# Patient Record
Sex: FEMALE | Race: WHITE | NOT HISPANIC OR LATINO
[De-identification: names, ages, dates, MRNs, and addresses within clinical notes are randomized per-mention and may not be internally consistent; named-entity substitution may affect disease eponyms.]

---

## 2019-12-24 ENCOUNTER — TRANSCRIPTION ENCOUNTER (OUTPATIENT)
Age: 84
End: 2019-12-24

## 2019-12-26 ENCOUNTER — TRANSCRIPTION ENCOUNTER (OUTPATIENT)
Age: 84
End: 2019-12-26

## 2021-07-06 ENCOUNTER — TRANSCRIPTION ENCOUNTER (OUTPATIENT)
Age: 86
End: 2021-07-06

## 2021-10-21 ENCOUNTER — APPOINTMENT (OUTPATIENT)
Dept: ORTHOPEDIC SURGERY | Facility: CLINIC | Age: 86
End: 2021-10-21
Payer: MEDICARE

## 2021-10-21 VITALS
WEIGHT: 137.4 LBS | HEART RATE: 67 BPM | SYSTOLIC BLOOD PRESSURE: 159 MMHG | DIASTOLIC BLOOD PRESSURE: 76 MMHG | HEIGHT: 62 IN | BODY MASS INDEX: 25.28 KG/M2

## 2021-10-21 DIAGNOSIS — R20.8 OTHER DISTURBANCES OF SKIN SENSATION: ICD-10-CM

## 2021-10-21 DIAGNOSIS — Z86.79 PERSONAL HISTORY OF OTHER DISEASES OF THE CIRCULATORY SYSTEM: ICD-10-CM

## 2021-10-21 DIAGNOSIS — Z82.3 FAMILY HISTORY OF STROKE: ICD-10-CM

## 2021-10-21 DIAGNOSIS — Z86.39 PERSONAL HISTORY OF OTHER ENDOCRINE, NUTRITIONAL AND METABOLIC DISEASE: ICD-10-CM

## 2021-10-21 DIAGNOSIS — Z78.9 OTHER SPECIFIED HEALTH STATUS: ICD-10-CM

## 2021-10-21 DIAGNOSIS — Z87.39 PERSONAL HISTORY OF OTHER DISEASES OF THE MUSCULOSKELETAL SYSTEM AND CONNECTIVE TISSUE: ICD-10-CM

## 2021-10-21 PROCEDURE — 99203 OFFICE O/P NEW LOW 30 MIN: CPT

## 2021-10-21 RX ORDER — MULTIVIT-MIN/FA/LYCOPEN/LUTEIN .4-300-25
TABLET ORAL
Refills: 0 | Status: ACTIVE | COMMUNITY

## 2021-10-21 RX ORDER — DORZOLAMIDE HYDROCHLORIDE 20 MG/ML
2 SOLUTION OPHTHALMIC
Refills: 0 | Status: ACTIVE | COMMUNITY

## 2021-10-21 NOTE — HISTORY OF PRESENT ILLNESS
[de-identified] : This 89-year-old woman is accompanied by her daughter and presents for evaluation of numbness in her left foot.  She has had bilateral total hip replacements.  The first was in 2007 and the second in 2014.  The recent hip replacement was performed by Dr. Aldridge.  In 2019 she fell and dislocated her left total hip.  This was reduced in the emergency room.  She now states that her left leg falls asleep for the last month.  She localizes the area just to her left foot.  She has had no recent trauma.  She takes no medication for this problem.  She was seen in Dr. Aldridge's office but no diagnosis was given.

## 2021-10-21 NOTE — PHYSICAL EXAM
[LE] : Sensory: Intact in bilateral lower extremities [Knee] : patellar 2+ and symmetric bilaterally [Ankle] : ankle 2+ and symmetric bilaterally [DP] : dorsalis pedis 2+ and symmetric bilaterally [PT] : posterior tibial 2+ and symmetric bilaterally [Normal] : Alert and in no acute distress [Poor Appearance] : well-appearing [Acute Distress] : not in acute distress [de-identified] : The patient has no respiratory distress. Mood and affect are normal. The patient is alert and oriented to person, place and time.\par The patient has no tenderness of either hip.  There is no pain with active or passive motion of the hips.  There is no tenderness of either knee.  She has minimal discomfort with knee motion and mild crepitus.  Both calves are soft and nontender.  Both Achilles tendons are intact.  She feels that her left foot is mildly numb compared to the right.  She has normal motor function in both lower extremities.  The numbness is not affected by motion of the hips, knees or ankles.  She has varicosities in the lower extremities. [de-identified] : X-rays of both hips were taken on October 6, 2021 at [facility] imaging.  These x-rays are reviewed.  They demonstrate bilateral total hip arthroplasty in good position.  She also had x-rays performed in both knees on the same day which demonstrate osteoarthritis of both knees.

## 2021-10-21 NOTE — DISCUSSION/SUMMARY
[de-identified] : The patient has apparent numbness of her left foot.  Motor function is quite normal.  I do not associate this with any of her orthopedic conditions in the lower extremities.  I have recommended she have neurologic evaluation if this troubles her.

## 2021-10-23 ENCOUNTER — TRANSCRIPTION ENCOUNTER (OUTPATIENT)
Age: 86
End: 2021-10-23

## 2021-11-08 ENCOUNTER — TRANSCRIPTION ENCOUNTER (OUTPATIENT)
Age: 86
End: 2021-11-08

## 2022-01-10 ENCOUNTER — TRANSCRIPTION ENCOUNTER (OUTPATIENT)
Age: 87
End: 2022-01-10

## 2022-08-07 ENCOUNTER — NON-APPOINTMENT (OUTPATIENT)
Age: 87
End: 2022-08-07

## 2022-10-31 ENCOUNTER — NON-APPOINTMENT (OUTPATIENT)
Age: 87
End: 2022-10-31

## 2023-03-06 ENCOUNTER — NON-APPOINTMENT (OUTPATIENT)
Age: 88
End: 2023-03-06

## 2023-03-08 ENCOUNTER — NON-APPOINTMENT (OUTPATIENT)
Age: 88
End: 2023-03-08

## 2023-03-12 ENCOUNTER — NON-APPOINTMENT (OUTPATIENT)
Age: 88
End: 2023-03-12

## 2023-10-24 ENCOUNTER — INPATIENT (INPATIENT)
Facility: HOSPITAL | Age: 88
LOS: 3 days | Discharge: NURSING, REHAB OR LONG-TERM CARE | DRG: 563 | End: 2023-10-28
Attending: HOSPITALIST | Admitting: HOSPITALIST
Payer: MEDICARE

## 2023-10-24 VITALS
RESPIRATION RATE: 18 BRPM | HEART RATE: 65 BPM | OXYGEN SATURATION: 100 % | HEIGHT: 62 IN | TEMPERATURE: 98 F | WEIGHT: 119.93 LBS | SYSTOLIC BLOOD PRESSURE: 137 MMHG | DIASTOLIC BLOOD PRESSURE: 66 MMHG

## 2023-10-24 PROCEDURE — 73502 X-RAY EXAM HIP UNI 2-3 VIEWS: CPT | Mod: 26,RT

## 2023-10-24 PROCEDURE — 73551 X-RAY EXAM OF FEMUR 1: CPT | Mod: 26,RT

## 2023-10-24 PROCEDURE — 73562 X-RAY EXAM OF KNEE 3: CPT | Mod: 26,RT

## 2023-10-24 PROCEDURE — 93971 EXTREMITY STUDY: CPT | Mod: 26,RT

## 2023-10-24 PROCEDURE — 73590 X-RAY EXAM OF LOWER LEG: CPT | Mod: 26,RT

## 2023-10-24 PROCEDURE — 99285 EMERGENCY DEPT VISIT HI MDM: CPT | Mod: FS

## 2023-10-24 RX ORDER — LIDOCAINE 4 G/100G
1 CREAM TOPICAL ONCE
Refills: 0 | Status: COMPLETED | OUTPATIENT
Start: 2023-10-24 | End: 2023-10-24

## 2023-10-24 RX ORDER — ACETAMINOPHEN 500 MG
650 TABLET ORAL ONCE
Refills: 0 | Status: COMPLETED | OUTPATIENT
Start: 2023-10-24 | End: 2023-10-24

## 2023-10-24 RX ADMIN — Medication 650 MILLIGRAM(S): at 20:43

## 2023-10-24 RX ADMIN — LIDOCAINE 1 PATCH: 4 CREAM TOPICAL at 20:43

## 2023-10-24 RX ADMIN — Medication 650 MILLIGRAM(S): at 23:29

## 2023-10-24 NOTE — ED ADULT NURSE NOTE - NSFALLHARMRISKINTERV_ED_ALL_ED
Assistance OOB with selected safe patient handling equipment if applicable/Assistance with ambulation/Communicate risk of Fall with Harm to all staff, patient, and family/Monitor gait and stability/Provide visual cue: red socks, yellow wristband, yellow gown, etc/Reinforce activity limits and safety measures with patient and family/Bed in lowest position, wheels locked, appropriate side rails in place/Call bell, personal items and telephone in reach/Instruct patient to call for assistance before getting out of bed/chair/stretcher/Non-slip footwear applied when patient is off stretcher/[place] to call system/Physically safe environment - no spills, clutter or unnecessary equipment/Purposeful Proactive Rounding/Room/bathroom lighting operational, light cord in reach

## 2023-10-24 NOTE — ED ADULT NURSE NOTE - OBJECTIVE STATEMENT
Patient c/o pain in R leg onset of today. Patient states that she was getting out of her car today and suddenly had pain, Pain is primarily in R knee. Denies trauma to area. No other injuries. Denies swelling, redness, sob, fever, chills, n/v/d. Patient A&Ox4, ambulatory at baseline. No signs of acute distress at this time. Plan of care in progress.

## 2023-10-24 NOTE — ED PROVIDER NOTE - ATTENDING APP SHARED VISIT CONTRIBUTION OF CARE
Attending MD Jolanta Valladares:   I personally have seen and examined this patient.  Physician assistant note reviewed and agree on plan of care and except where noted.  See HPI, PE, and MDM for details.

## 2023-10-24 NOTE — ED PROVIDER NOTE - PHYSICAL EXAMINATION
Attending Jolanta Vlaladares: Gen: NAD, heent: atrauamtic, mmm, op pink, uvula midline, neck; nttp, no nuchal rigidity, chest: nttp, no crepitus, cv: rrr, no murmurs, lungs: ctab, abd: soft, nontender, nondistended, no peritoneal signs, , no guarding, ext: wwp, ttp right lateral knee, no ttp tibial  plateau, no erythema or warmth, strong distal pulse no calf ttpskin: no rash, neuro: awake and alert, following commands, speech clear, sensation and strength intact, no focal deficits

## 2023-10-24 NOTE — ED PROVIDER NOTE - PRO INTERPRETER NEED 2
English
Gen: Alert, NAD  Head/eyes: NC/AT, PERRL  ENT: airway patent  Neck: supple, no tenderness/meningismus/JVD, Trachea midline  Pulm/lung: Bilateral clear BS, normal resp effort, no wheeze/stridor/retractions  CV/heart: RRR, no M/R/G, +2 dist pulses (radial, pedal DP/PT, popliteal)  GI/Abd: soft, NT/ND, +BS, no guarding/rebound tenderness  Musculoskeletal: no edema/erythema/cyanosis, FROM in all extremities, no C/T/L spine ttp  Skin: no rash, no vesicles, no petechaie, no ecchymosis, no swelling  Neuro: AAOx3, CN 2-12 intact, normal sensation, 5/5 motor strength in all extremities

## 2023-10-24 NOTE — ED PROVIDER NOTE - PROGRESS NOTE DETAILS
Attending MD [employer].  PT signed out to me in stable condition pending Duplex, wrap, CT? amb challenge, dispo, 89 yo fem with sig arthritis, placed foot with severe knee pain s/p planting, small lateral effusion. Attending MD Mansfield.  PT has been unable to tolerate ambulation 2/2 pain.  Planned CT and admission 2/2 new inability to ambulate. Paola diallo PA-C: CT with no acute pathology. will admit patient for inability to ambulate and PT/OT eval. discussed with Dr. Mansfield

## 2023-10-24 NOTE — ED PROVIDER NOTE - CLINICAL SUMMARY MEDICAL DECISION MAKING FREE TEXT BOX
Attending Jolanta Valladares: 89 yo female presenting with with knee pain. upon arrival pt awake and following commands. ext wwp, strong distal pulses and neurovascular intact. less likely arterial cause of pain. pt able to range knee without erythema or warmth making septic joint less likely. pt with small effusion on pocus. consider possible inflammatory arthritis, vs muscle strain, vs ligamentous injury. consider possible bakers cyst. less likely DVT. will obtain xray, consider duplex, pain control and re-eval.

## 2023-10-24 NOTE — ED PROVIDER NOTE - OBJECTIVE STATEMENT
89 y/o female with PMHx of HTN, CAD with stents presents to the ED complaining of right leg pain x few hours. Patient states that she was getting out of the drivers seat of her car when she had sudden onset of pain to the right upper leg. She not fall out of the car. Did not hear a pop. She had to have her family member help her out of car into the house. She felt as though she was unable to ambulate on her own. Normally ambulates

## 2023-10-25 DIAGNOSIS — Z96.643 PRESENCE OF ARTIFICIAL HIP JOINT, BILATERAL: Chronic | ICD-10-CM

## 2023-10-25 DIAGNOSIS — Z29.9 ENCOUNTER FOR PROPHYLACTIC MEASURES, UNSPECIFIED: ICD-10-CM

## 2023-10-25 DIAGNOSIS — M79.606 PAIN IN LEG, UNSPECIFIED: ICD-10-CM

## 2023-10-25 DIAGNOSIS — H40.9 UNSPECIFIED GLAUCOMA: ICD-10-CM

## 2023-10-25 DIAGNOSIS — Z95.5 PRESENCE OF CORONARY ANGIOPLASTY IMPLANT AND GRAFT: Chronic | ICD-10-CM

## 2023-10-25 DIAGNOSIS — M25.561 PAIN IN RIGHT KNEE: ICD-10-CM

## 2023-10-25 DIAGNOSIS — I25.10 ATHEROSCLEROTIC HEART DISEASE OF NATIVE CORONARY ARTERY WITHOUT ANGINA PECTORIS: ICD-10-CM

## 2023-10-25 LAB
ALBUMIN SERPL ELPH-MCNC: 3.6 G/DL — SIGNIFICANT CHANGE UP (ref 3.3–5)
ALBUMIN SERPL ELPH-MCNC: 3.6 G/DL — SIGNIFICANT CHANGE UP (ref 3.3–5)
ALP SERPL-CCNC: 48 U/L — SIGNIFICANT CHANGE UP (ref 40–120)
ALP SERPL-CCNC: 48 U/L — SIGNIFICANT CHANGE UP (ref 40–120)
ALT FLD-CCNC: 18 U/L — SIGNIFICANT CHANGE UP (ref 10–45)
ALT FLD-CCNC: 18 U/L — SIGNIFICANT CHANGE UP (ref 10–45)
ANION GAP SERPL CALC-SCNC: 12 MMOL/L — SIGNIFICANT CHANGE UP (ref 5–17)
ANION GAP SERPL CALC-SCNC: 12 MMOL/L — SIGNIFICANT CHANGE UP (ref 5–17)
ANION GAP SERPL CALC-SCNC: 9 MMOL/L — SIGNIFICANT CHANGE UP (ref 5–17)
ANION GAP SERPL CALC-SCNC: 9 MMOL/L — SIGNIFICANT CHANGE UP (ref 5–17)
AST SERPL-CCNC: 44 U/L — HIGH (ref 10–40)
AST SERPL-CCNC: 44 U/L — HIGH (ref 10–40)
BILIRUB SERPL-MCNC: 0.4 MG/DL — SIGNIFICANT CHANGE UP (ref 0.2–1.2)
BILIRUB SERPL-MCNC: 0.4 MG/DL — SIGNIFICANT CHANGE UP (ref 0.2–1.2)
BUN SERPL-MCNC: 24 MG/DL — HIGH (ref 7–23)
BUN SERPL-MCNC: 24 MG/DL — HIGH (ref 7–23)
BUN SERPL-MCNC: 25 MG/DL — HIGH (ref 7–23)
BUN SERPL-MCNC: 25 MG/DL — HIGH (ref 7–23)
CALCIUM SERPL-MCNC: 9.3 MG/DL — SIGNIFICANT CHANGE UP (ref 8.4–10.5)
CALCIUM SERPL-MCNC: 9.3 MG/DL — SIGNIFICANT CHANGE UP (ref 8.4–10.5)
CALCIUM SERPL-MCNC: 9.7 MG/DL — SIGNIFICANT CHANGE UP (ref 8.4–10.5)
CALCIUM SERPL-MCNC: 9.7 MG/DL — SIGNIFICANT CHANGE UP (ref 8.4–10.5)
CHLORIDE SERPL-SCNC: 108 MMOL/L — SIGNIFICANT CHANGE UP (ref 96–108)
CHLORIDE SERPL-SCNC: 108 MMOL/L — SIGNIFICANT CHANGE UP (ref 96–108)
CHLORIDE SERPL-SCNC: 111 MMOL/L — HIGH (ref 96–108)
CHLORIDE SERPL-SCNC: 111 MMOL/L — HIGH (ref 96–108)
CK SERPL-CCNC: 85 U/L — SIGNIFICANT CHANGE UP (ref 25–170)
CK SERPL-CCNC: 85 U/L — SIGNIFICANT CHANGE UP (ref 25–170)
CO2 SERPL-SCNC: 20 MMOL/L — LOW (ref 22–31)
CO2 SERPL-SCNC: 20 MMOL/L — LOW (ref 22–31)
CO2 SERPL-SCNC: 23 MMOL/L — SIGNIFICANT CHANGE UP (ref 22–31)
CO2 SERPL-SCNC: 23 MMOL/L — SIGNIFICANT CHANGE UP (ref 22–31)
CREAT SERPL-MCNC: 0.76 MG/DL — SIGNIFICANT CHANGE UP (ref 0.5–1.3)
CREAT SERPL-MCNC: 0.76 MG/DL — SIGNIFICANT CHANGE UP (ref 0.5–1.3)
CREAT SERPL-MCNC: 0.81 MG/DL — SIGNIFICANT CHANGE UP (ref 0.5–1.3)
CREAT SERPL-MCNC: 0.81 MG/DL — SIGNIFICANT CHANGE UP (ref 0.5–1.3)
CRP SERPL-MCNC: <3 MG/L — SIGNIFICANT CHANGE UP (ref 0–4)
CRP SERPL-MCNC: <3 MG/L — SIGNIFICANT CHANGE UP (ref 0–4)
EGFR: 68 ML/MIN/1.73M2 — SIGNIFICANT CHANGE UP
EGFR: 68 ML/MIN/1.73M2 — SIGNIFICANT CHANGE UP
EGFR: 73 ML/MIN/1.73M2 — SIGNIFICANT CHANGE UP
EGFR: 73 ML/MIN/1.73M2 — SIGNIFICANT CHANGE UP
ERYTHROCYTE [SEDIMENTATION RATE] IN BLOOD: 4 MM/HR — SIGNIFICANT CHANGE UP (ref 0–20)
ERYTHROCYTE [SEDIMENTATION RATE] IN BLOOD: 4 MM/HR — SIGNIFICANT CHANGE UP (ref 0–20)
GLUCOSE SERPL-MCNC: 109 MG/DL — HIGH (ref 70–99)
GLUCOSE SERPL-MCNC: 109 MG/DL — HIGH (ref 70–99)
GLUCOSE SERPL-MCNC: 86 MG/DL — SIGNIFICANT CHANGE UP (ref 70–99)
GLUCOSE SERPL-MCNC: 86 MG/DL — SIGNIFICANT CHANGE UP (ref 70–99)
HCT VFR BLD CALC: 38.2 % — SIGNIFICANT CHANGE UP (ref 34.5–45)
HCT VFR BLD CALC: 38.2 % — SIGNIFICANT CHANGE UP (ref 34.5–45)
HGB BLD-MCNC: 12.2 G/DL — SIGNIFICANT CHANGE UP (ref 11.5–15.5)
HGB BLD-MCNC: 12.2 G/DL — SIGNIFICANT CHANGE UP (ref 11.5–15.5)
MCHC RBC-ENTMCNC: 31.1 PG — SIGNIFICANT CHANGE UP (ref 27–34)
MCHC RBC-ENTMCNC: 31.1 PG — SIGNIFICANT CHANGE UP (ref 27–34)
MCHC RBC-ENTMCNC: 31.9 GM/DL — LOW (ref 32–36)
MCHC RBC-ENTMCNC: 31.9 GM/DL — LOW (ref 32–36)
MCV RBC AUTO: 97.4 FL — SIGNIFICANT CHANGE UP (ref 80–100)
MCV RBC AUTO: 97.4 FL — SIGNIFICANT CHANGE UP (ref 80–100)
NRBC # BLD: 0 /100 WBCS — SIGNIFICANT CHANGE UP (ref 0–0)
NRBC # BLD: 0 /100 WBCS — SIGNIFICANT CHANGE UP (ref 0–0)
PLATELET # BLD AUTO: 125 K/UL — LOW (ref 150–400)
PLATELET # BLD AUTO: 125 K/UL — LOW (ref 150–400)
POTASSIUM SERPL-MCNC: 4.1 MMOL/L — SIGNIFICANT CHANGE UP (ref 3.5–5.3)
POTASSIUM SERPL-MCNC: 4.1 MMOL/L — SIGNIFICANT CHANGE UP (ref 3.5–5.3)
POTASSIUM SERPL-MCNC: 6.3 MMOL/L — CRITICAL HIGH (ref 3.5–5.3)
POTASSIUM SERPL-MCNC: 6.3 MMOL/L — CRITICAL HIGH (ref 3.5–5.3)
POTASSIUM SERPL-SCNC: 4.1 MMOL/L — SIGNIFICANT CHANGE UP (ref 3.5–5.3)
POTASSIUM SERPL-SCNC: 4.1 MMOL/L — SIGNIFICANT CHANGE UP (ref 3.5–5.3)
POTASSIUM SERPL-SCNC: 6.3 MMOL/L — CRITICAL HIGH (ref 3.5–5.3)
POTASSIUM SERPL-SCNC: 6.3 MMOL/L — CRITICAL HIGH (ref 3.5–5.3)
PROCALCITONIN SERPL-MCNC: 0.06 NG/ML — SIGNIFICANT CHANGE UP (ref 0.02–0.1)
PROCALCITONIN SERPL-MCNC: 0.06 NG/ML — SIGNIFICANT CHANGE UP (ref 0.02–0.1)
PROT SERPL-MCNC: 6.1 G/DL — SIGNIFICANT CHANGE UP (ref 6–8.3)
PROT SERPL-MCNC: 6.1 G/DL — SIGNIFICANT CHANGE UP (ref 6–8.3)
RBC # BLD: 3.92 M/UL — SIGNIFICANT CHANGE UP (ref 3.8–5.2)
RBC # BLD: 3.92 M/UL — SIGNIFICANT CHANGE UP (ref 3.8–5.2)
RBC # FLD: 13 % — SIGNIFICANT CHANGE UP (ref 10.3–14.5)
RBC # FLD: 13 % — SIGNIFICANT CHANGE UP (ref 10.3–14.5)
SODIUM SERPL-SCNC: 140 MMOL/L — SIGNIFICANT CHANGE UP (ref 135–145)
SODIUM SERPL-SCNC: 140 MMOL/L — SIGNIFICANT CHANGE UP (ref 135–145)
SODIUM SERPL-SCNC: 143 MMOL/L — SIGNIFICANT CHANGE UP (ref 135–145)
SODIUM SERPL-SCNC: 143 MMOL/L — SIGNIFICANT CHANGE UP (ref 135–145)
TSH SERPL-MCNC: 1.01 UIU/ML — SIGNIFICANT CHANGE UP (ref 0.27–4.2)
TSH SERPL-MCNC: 1.01 UIU/ML — SIGNIFICANT CHANGE UP (ref 0.27–4.2)
URATE SERPL-MCNC: 5.2 MG/DL — SIGNIFICANT CHANGE UP (ref 2.5–7)
URATE SERPL-MCNC: 5.2 MG/DL — SIGNIFICANT CHANGE UP (ref 2.5–7)
WBC # BLD: 4.9 K/UL — SIGNIFICANT CHANGE UP (ref 3.8–10.5)
WBC # BLD: 4.9 K/UL — SIGNIFICANT CHANGE UP (ref 3.8–10.5)
WBC # FLD AUTO: 4.9 K/UL — SIGNIFICANT CHANGE UP (ref 3.8–10.5)
WBC # FLD AUTO: 4.9 K/UL — SIGNIFICANT CHANGE UP (ref 3.8–10.5)

## 2023-10-25 PROCEDURE — 76377 3D RENDER W/INTRP POSTPROCES: CPT | Mod: 26

## 2023-10-25 PROCEDURE — 73721 MRI JNT OF LWR EXTRE W/O DYE: CPT | Mod: 26,RT

## 2023-10-25 PROCEDURE — 73700 CT LOWER EXTREMITY W/O DYE: CPT | Mod: 26,RT,MA

## 2023-10-25 PROCEDURE — 99223 1ST HOSP IP/OBS HIGH 75: CPT

## 2023-10-25 RX ORDER — DORZOLAMIDE HYDROCHLORIDE 20 MG/ML
1 SOLUTION/ DROPS OPHTHALMIC
Refills: 0 | Status: DISCONTINUED | OUTPATIENT
Start: 2023-10-25 | End: 2023-10-27

## 2023-10-25 RX ORDER — LANOLIN ALCOHOL/MO/W.PET/CERES
3 CREAM (GRAM) TOPICAL AT BEDTIME
Refills: 0 | Status: DISCONTINUED | OUTPATIENT
Start: 2023-10-25 | End: 2023-10-28

## 2023-10-25 RX ORDER — LATANOPROST 0.05 MG/ML
1 SOLUTION/ DROPS OPHTHALMIC; TOPICAL AT BEDTIME
Refills: 0 | Status: DISCONTINUED | OUTPATIENT
Start: 2023-10-25 | End: 2023-10-28

## 2023-10-25 RX ORDER — DICLOFENAC SODIUM 30 MG/G
4 GEL TOPICAL
Refills: 0 | Status: DISCONTINUED | OUTPATIENT
Start: 2023-10-25 | End: 2023-10-28

## 2023-10-25 RX ORDER — ASPIRIN/CALCIUM CARB/MAGNESIUM 324 MG
81 TABLET ORAL DAILY
Refills: 0 | Status: DISCONTINUED | OUTPATIENT
Start: 2023-10-25 | End: 2023-10-28

## 2023-10-25 RX ORDER — ONDANSETRON 8 MG/1
4 TABLET, FILM COATED ORAL EVERY 8 HOURS
Refills: 0 | Status: DISCONTINUED | OUTPATIENT
Start: 2023-10-25 | End: 2023-10-28

## 2023-10-25 RX ORDER — MULTIVIT-MIN/FERROUS GLUCONATE 9 MG/15 ML
1 LIQUID (ML) ORAL DAILY
Refills: 0 | Status: DISCONTINUED | OUTPATIENT
Start: 2023-10-25 | End: 2023-10-28

## 2023-10-25 RX ORDER — DORZOLAMIDE HYDROCHLORIDE 20 MG/ML
1 SOLUTION/ DROPS OPHTHALMIC
Refills: 0 | DISCHARGE

## 2023-10-25 RX ORDER — ENOXAPARIN SODIUM 100 MG/ML
40 INJECTION SUBCUTANEOUS EVERY 24 HOURS
Refills: 0 | Status: DISCONTINUED | OUTPATIENT
Start: 2023-10-25 | End: 2023-10-28

## 2023-10-25 RX ORDER — CARVEDILOL PHOSPHATE 80 MG/1
6.25 CAPSULE, EXTENDED RELEASE ORAL EVERY 12 HOURS
Refills: 0 | Status: DISCONTINUED | OUTPATIENT
Start: 2023-10-25 | End: 2023-10-28

## 2023-10-25 RX ORDER — ACETAMINOPHEN 500 MG
975 TABLET ORAL EVERY 8 HOURS
Refills: 0 | Status: DISCONTINUED | OUTPATIENT
Start: 2023-10-25 | End: 2023-10-28

## 2023-10-25 RX ORDER — ASPIRIN/CALCIUM CARB/MAGNESIUM 324 MG
2 TABLET ORAL
Refills: 0 | DISCHARGE

## 2023-10-25 RX ORDER — CARVEDILOL PHOSPHATE 80 MG/1
1 CAPSULE, EXTENDED RELEASE ORAL
Refills: 0 | DISCHARGE

## 2023-10-25 RX ORDER — LATANOPROST 0.05 MG/ML
1 SOLUTION/ DROPS OPHTHALMIC; TOPICAL
Refills: 0 | DISCHARGE

## 2023-10-25 RX ORDER — ATORVASTATIN CALCIUM 80 MG/1
10 TABLET, FILM COATED ORAL AT BEDTIME
Refills: 0 | Status: DISCONTINUED | OUTPATIENT
Start: 2023-10-25 | End: 2023-10-28

## 2023-10-25 RX ORDER — ATORVASTATIN CALCIUM 80 MG/1
1 TABLET, FILM COATED ORAL
Refills: 0 | DISCHARGE

## 2023-10-25 RX ADMIN — Medication 975 MILLIGRAM(S): at 22:59

## 2023-10-25 RX ADMIN — ATORVASTATIN CALCIUM 10 MILLIGRAM(S): 80 TABLET, FILM COATED ORAL at 21:44

## 2023-10-25 RX ADMIN — DICLOFENAC SODIUM 4 GRAM(S): 30 GEL TOPICAL at 12:42

## 2023-10-25 RX ADMIN — DORZOLAMIDE HYDROCHLORIDE 1 DROP(S): 20 SOLUTION/ DROPS OPHTHALMIC at 18:39

## 2023-10-25 RX ADMIN — CARVEDILOL PHOSPHATE 6.25 MILLIGRAM(S): 80 CAPSULE, EXTENDED RELEASE ORAL at 18:31

## 2023-10-25 RX ADMIN — Medication 0.5 MILLIGRAM(S): at 15:59

## 2023-10-25 RX ADMIN — Medication 81 MILLIGRAM(S): at 12:43

## 2023-10-25 RX ADMIN — Medication 975 MILLIGRAM(S): at 21:42

## 2023-10-25 RX ADMIN — DICLOFENAC SODIUM 4 GRAM(S): 30 GEL TOPICAL at 18:30

## 2023-10-25 RX ADMIN — LATANOPROST 1 DROP(S): 0.05 SOLUTION/ DROPS OPHTHALMIC; TOPICAL at 22:56

## 2023-10-25 RX ADMIN — Medication 975 MILLIGRAM(S): at 12:43

## 2023-10-25 NOTE — H&P ADULT - NSHPLABSRESULTS_GEN_ALL_CORE
LABS:                        12.2   4.90  )-----------( 125      ( 25 Oct 2023 07:05 )             38.2       10-25    140  |  111<H>  |  24<H>  ----------------------------<  86  6.3<HH>   |  20<L>  |  0.76    Ca    9.3      25 Oct 2023 07:05    TPro  6.1  /  Alb  3.6  /  TBili  0.4  /  DBili  x   /  AST  44<H>  /  ALT  18  /  AlkPhos  48  10-25        CAPILLARY BLOOD GLUCOSE    Lactate Trend    Urinalysis Basic - ( 25 Oct 2023 07:05 )    Color: x / Appearance: x / SG: x / pH: x  Gluc: 86 mg/dL / Ketone: x  / Bili: x / Urobili: x   Blood: x / Protein: x / Nitrite: x   Leuk Esterase: x / RBC: x / WBC x   Sq Epi: x / Non Sq Epi: x / Bacteria: x    RADIOLOGY:    x< from: Xray Tibia + Fibula 2 Views, Right (10.24.23 @ 20:07) >      IMPRESSION:  No acute fracture or dislocation.    --- End of Report ---      < end of copied text >    < from: Xray Hip 2-3 Views, Right (10.24.23 @ 20:06) >      FINDINGS:    Hip:  No acute fracture. No dislocation. Right hip arthroplasty without   periprosthetic lucency to suggest loosening.    Knee:  No acute fracture. No dislocation. Mild knee joint arthrosis. No joint  effusion.    Visualized ankle:  No acute fracture. No dislocation. Joint spaces are maintained. No joint   effusion.        IMPRESSION:  No acute fracture or dislocation.    --- End of Report ---    < end of copied text >    < from: CT Knee No Cont, Right (10.25.23 @ 01:26) >    IMPRESSION:    No obvious displaced fracture or dislocation. If there is continued   clinical suspicion, MRI may be obtained for further evaluation.    --- End of Report ---    < end of copied text >

## 2023-10-25 NOTE — H&P ADULT - NSICDXPASTSURGICALHX_GEN_ALL_CORE_FT
PAST SURGICAL HISTORY:  Presence of stent in coronary artery      PAST SURGICAL HISTORY:  History of bilateral hip replacements     Presence of stent in coronary artery

## 2023-10-25 NOTE — PHYSICAL THERAPY INITIAL EVALUATION ADULT - PERTINENT HX OF CURRENT PROBLEM, REHAB EVAL
89 y/o female with PMHx of HTN, CAD with stents; presents to the ED complaining of right leg pain x few hours causing difficulty ambulating. Admitted for further mgmt. No acute fracture on X-ray or CT so far. Pending MRI Pt is a 89 year old female with PMHx of HTN, CAD with stents. Pt presents to the ED complaining of right leg pain x few hours causing difficulty ambulating. Admitted for further mgmt. No acute fracture on X-ray or CT so far.  XR Knee/Femur/Tibia/Fibula/ Right(10/24): No acute fracture or dislocation.  Duplex Right LE (10/24): No evidence of right lower extremity deep venous thrombosis.  CT Knee Right (10/25): No obvious displaced fracture or dislocation. If there is continued clinical suspicion, MRI may be obtained for further evaluation.  MRI Knee Right (10/25):  Low grade partial tearing of the proximal substance of the   MCL with periligamentous edema.  Patellofemoral compartment arthrosis, as above. No fracture.

## 2023-10-25 NOTE — H&P ADULT - ASSESSMENT
89 y/o female with PMHx of HTN, CAD with stents; presents to the ED complaining of right leg pain x few hours causing difficulty ambulating. Admitted for further mgmt.

## 2023-10-25 NOTE — H&P ADULT - NSICDXPASTMEDICALHX_GEN_ALL_CORE_FT
PAST MEDICAL HISTORY:  CAD (coronary artery disease)     Glaucoma     Hypertension      PAST MEDICAL HISTORY:  CAD (coronary artery disease)     Glaucoma     Hypertension     Osteoarthritis

## 2023-10-25 NOTE — H&P ADULT - PROBLEM SELECTOR PLAN 4
-Lovenox 40mg SQ Daily. Monitor platelets.   -Hyperkalemia noted but lab sample hemolyzed. -F/u repeat BMP.     5. Discussed plan with patient and her son-in-law at bedside, and -Lovenox 40mg SQ Daily. Monitor platelets.   -Hyperkalemia noted but lab sample hemolyzed. -F/u repeat BMP.     5. Discussed plan with patient and her son-in-law at bedside, and DIANA Hicks.

## 2023-10-25 NOTE — H&P ADULT - HISTORY OF PRESENT ILLNESS
89 y/o female with PMHx of HTN, CAD with stents presents to the ED complaining of right leg pain x few hours. Patient states that she was getting out of the drivers seat of her car when she had sudden onset of pain to the right upper leg. She not fall out of the car. Did not hear a pop. She had to have her family member help her out of car into the house. She felt as though she was unable to ambulate on her own. Normally ambulates  In ED received Tylenol and lidocaine.

## 2023-10-25 NOTE — H&P ADULT - PROBLEM SELECTOR PLAN 1
-Severe right knee pain and unable to ambulate after hurting it getting out of her car. No acute fracture on X-ray or CT so far. Will get MRI right knee to further eval.   -Tylenol 975mg TID. Will add topical Voltaren BID. Ice packs. Elevation. Can use lidocaine.   -Vitamin D level.   -PT.  -F/u ESR, CRP, uric acid.

## 2023-10-25 NOTE — PHYSICAL THERAPY INITIAL EVALUATION ADULT - GENERAL OBSERVATIONS, REHAB EVAL
Per Resident Phi Canales, pt PWB RLE. Pt rec'd semisupine in bed, +bed alarm, in NAD, son at bedside.  Pt cleared for PT session by GENE Matat. Per Resident Phi Canales, pt PWB RLE.

## 2023-10-25 NOTE — PHYSICAL THERAPY INITIAL EVALUATION ADULT - MANUAL MUSCLE TESTING RESULTS, REHAB EVAL
Bilateral UE grossly 3+/5.  Left LE 3+/5, Right LE 3/5, limited due to pain. Bilateral  strength WNL.

## 2023-10-25 NOTE — H&P ADULT - NSHPPHYSICALEXAM_GEN_ALL_CORE
PHYSICAL EXAM:  Vital Signs Last 24 Hrs  T(C): 36.7 (10-25-23 @ 07:35)  T(F): 98 (10-25-23 @ 07:35), Max: 98.2 (10-25-23 @ 05:54)  HR: 67 (10-25-23 @ 07:35) (62 - 68)  BP: 151/81 (10-25-23 @ 07:35)  BP(mean): --  RR: 16 (10-25-23 @ 07:35) (16 - 18)  SpO2: 96% (10-25-23 @ 07:35) (96% - 100%)  Wt(kg): --    Constitutional: NAD, awake and alert  EYES: EOMI  ENT:  Normal Hearing, no tonsillar exudates   Neck: Soft and supple, No JVD  Respiratory: Breath sounds are clear bilaterally, No wheezing, rales or rhonchi  Cardiovascular: S1 and S2, regular rate and rhythm, no Murmurs, gallops or rubs  Gastrointestinal: Bowel Sounds present, soft, nontender, nondistended, no guarding, no rebound  Extremities: No cyanosis or clubbing or edema; warm to touch; right knee tender and unable to flex at knee. Hip ok.   Neurological: A/O x 3, no focal deficits  Skin: No rashes  Psych: No depression or anhedonia  Heme: No bruises, no nose bleeds

## 2023-10-25 NOTE — PHYSICAL THERAPY INITIAL EVALUATION ADULT - ADDITIONAL COMMENTS
Pt lives alone in an apartment with no steps to enter, elevator access to living space.  Pt is independent with ADLs and ambulation, but owns a rolling walker and cane.  Pt still drives.

## 2023-10-26 ENCOUNTER — TRANSCRIPTION ENCOUNTER (OUTPATIENT)
Age: 88
End: 2023-10-26

## 2023-10-26 LAB
24R-OH-CALCIDIOL SERPL-MCNC: 28.1 NG/ML — LOW (ref 30–80)
24R-OH-CALCIDIOL SERPL-MCNC: 28.1 NG/ML — LOW (ref 30–80)
A1C WITH ESTIMATED AVERAGE GLUCOSE RESULT: 5.5 % — SIGNIFICANT CHANGE UP (ref 4–5.6)
A1C WITH ESTIMATED AVERAGE GLUCOSE RESULT: 5.5 % — SIGNIFICANT CHANGE UP (ref 4–5.6)
ANION GAP SERPL CALC-SCNC: 11 MMOL/L — SIGNIFICANT CHANGE UP (ref 5–17)
ANION GAP SERPL CALC-SCNC: 11 MMOL/L — SIGNIFICANT CHANGE UP (ref 5–17)
APPEARANCE UR: CLEAR — SIGNIFICANT CHANGE UP
APPEARANCE UR: CLEAR — SIGNIFICANT CHANGE UP
BILIRUB UR-MCNC: NEGATIVE — SIGNIFICANT CHANGE UP
BILIRUB UR-MCNC: NEGATIVE — SIGNIFICANT CHANGE UP
BUN SERPL-MCNC: 25 MG/DL — HIGH (ref 7–23)
BUN SERPL-MCNC: 25 MG/DL — HIGH (ref 7–23)
CALCIUM SERPL-MCNC: 9.5 MG/DL — SIGNIFICANT CHANGE UP (ref 8.4–10.5)
CALCIUM SERPL-MCNC: 9.5 MG/DL — SIGNIFICANT CHANGE UP (ref 8.4–10.5)
CHLORIDE SERPL-SCNC: 110 MMOL/L — HIGH (ref 96–108)
CHLORIDE SERPL-SCNC: 110 MMOL/L — HIGH (ref 96–108)
CO2 SERPL-SCNC: 21 MMOL/L — LOW (ref 22–31)
CO2 SERPL-SCNC: 21 MMOL/L — LOW (ref 22–31)
COLOR SPEC: SIGNIFICANT CHANGE UP
COLOR SPEC: SIGNIFICANT CHANGE UP
CREAT SERPL-MCNC: 0.87 MG/DL — SIGNIFICANT CHANGE UP (ref 0.5–1.3)
CREAT SERPL-MCNC: 0.87 MG/DL — SIGNIFICANT CHANGE UP (ref 0.5–1.3)
DIFF PNL FLD: NEGATIVE — SIGNIFICANT CHANGE UP
DIFF PNL FLD: NEGATIVE — SIGNIFICANT CHANGE UP
EGFR: 62 ML/MIN/1.73M2 — SIGNIFICANT CHANGE UP
EGFR: 62 ML/MIN/1.73M2 — SIGNIFICANT CHANGE UP
ESTIMATED AVERAGE GLUCOSE: 111 MG/DL — SIGNIFICANT CHANGE UP (ref 68–114)
ESTIMATED AVERAGE GLUCOSE: 111 MG/DL — SIGNIFICANT CHANGE UP (ref 68–114)
GLUCOSE SERPL-MCNC: 93 MG/DL — SIGNIFICANT CHANGE UP (ref 70–99)
GLUCOSE SERPL-MCNC: 93 MG/DL — SIGNIFICANT CHANGE UP (ref 70–99)
GLUCOSE UR QL: NEGATIVE — SIGNIFICANT CHANGE UP
GLUCOSE UR QL: NEGATIVE — SIGNIFICANT CHANGE UP
HCT VFR BLD CALC: 39 % — SIGNIFICANT CHANGE UP (ref 34.5–45)
HCT VFR BLD CALC: 39 % — SIGNIFICANT CHANGE UP (ref 34.5–45)
HGB BLD-MCNC: 12.7 G/DL — SIGNIFICANT CHANGE UP (ref 11.5–15.5)
HGB BLD-MCNC: 12.7 G/DL — SIGNIFICANT CHANGE UP (ref 11.5–15.5)
KETONES UR-MCNC: NEGATIVE — SIGNIFICANT CHANGE UP
KETONES UR-MCNC: NEGATIVE — SIGNIFICANT CHANGE UP
LEUKOCYTE ESTERASE UR-ACNC: ABNORMAL
LEUKOCYTE ESTERASE UR-ACNC: ABNORMAL
MCHC RBC-ENTMCNC: 31 PG — SIGNIFICANT CHANGE UP (ref 27–34)
MCHC RBC-ENTMCNC: 31 PG — SIGNIFICANT CHANGE UP (ref 27–34)
MCHC RBC-ENTMCNC: 32.6 GM/DL — SIGNIFICANT CHANGE UP (ref 32–36)
MCHC RBC-ENTMCNC: 32.6 GM/DL — SIGNIFICANT CHANGE UP (ref 32–36)
MCV RBC AUTO: 95.1 FL — SIGNIFICANT CHANGE UP (ref 80–100)
MCV RBC AUTO: 95.1 FL — SIGNIFICANT CHANGE UP (ref 80–100)
NITRITE UR-MCNC: NEGATIVE — SIGNIFICANT CHANGE UP
NITRITE UR-MCNC: NEGATIVE — SIGNIFICANT CHANGE UP
NRBC # BLD: 0 /100 WBCS — SIGNIFICANT CHANGE UP (ref 0–0)
NRBC # BLD: 0 /100 WBCS — SIGNIFICANT CHANGE UP (ref 0–0)
PH UR: 5.5 — SIGNIFICANT CHANGE UP (ref 5–8)
PH UR: 5.5 — SIGNIFICANT CHANGE UP (ref 5–8)
PLATELET # BLD AUTO: 132 K/UL — LOW (ref 150–400)
PLATELET # BLD AUTO: 132 K/UL — LOW (ref 150–400)
POTASSIUM SERPL-MCNC: 4.1 MMOL/L — SIGNIFICANT CHANGE UP (ref 3.5–5.3)
POTASSIUM SERPL-MCNC: 4.1 MMOL/L — SIGNIFICANT CHANGE UP (ref 3.5–5.3)
POTASSIUM SERPL-SCNC: 4.1 MMOL/L — SIGNIFICANT CHANGE UP (ref 3.5–5.3)
POTASSIUM SERPL-SCNC: 4.1 MMOL/L — SIGNIFICANT CHANGE UP (ref 3.5–5.3)
PROT UR-MCNC: NEGATIVE — SIGNIFICANT CHANGE UP
PROT UR-MCNC: NEGATIVE — SIGNIFICANT CHANGE UP
RBC # BLD: 4.1 M/UL — SIGNIFICANT CHANGE UP (ref 3.8–5.2)
RBC # BLD: 4.1 M/UL — SIGNIFICANT CHANGE UP (ref 3.8–5.2)
RBC # FLD: 12.8 % — SIGNIFICANT CHANGE UP (ref 10.3–14.5)
RBC # FLD: 12.8 % — SIGNIFICANT CHANGE UP (ref 10.3–14.5)
SODIUM SERPL-SCNC: 142 MMOL/L — SIGNIFICANT CHANGE UP (ref 135–145)
SODIUM SERPL-SCNC: 142 MMOL/L — SIGNIFICANT CHANGE UP (ref 135–145)
SP GR SPEC: 1.02 — SIGNIFICANT CHANGE UP (ref 1.01–1.02)
SP GR SPEC: 1.02 — SIGNIFICANT CHANGE UP (ref 1.01–1.02)
UROBILINOGEN FLD QL: NEGATIVE — SIGNIFICANT CHANGE UP
UROBILINOGEN FLD QL: NEGATIVE — SIGNIFICANT CHANGE UP
VIT B12 SERPL-MCNC: 439 PG/ML — SIGNIFICANT CHANGE UP (ref 232–1245)
VIT B12 SERPL-MCNC: 439 PG/ML — SIGNIFICANT CHANGE UP (ref 232–1245)
WBC # BLD: 5.06 K/UL — SIGNIFICANT CHANGE UP (ref 3.8–10.5)
WBC # BLD: 5.06 K/UL — SIGNIFICANT CHANGE UP (ref 3.8–10.5)
WBC # FLD AUTO: 5.06 K/UL — SIGNIFICANT CHANGE UP (ref 3.8–10.5)
WBC # FLD AUTO: 5.06 K/UL — SIGNIFICANT CHANGE UP (ref 3.8–10.5)

## 2023-10-26 PROCEDURE — 99232 SBSQ HOSP IP/OBS MODERATE 35: CPT | Mod: GC

## 2023-10-26 RX ORDER — DICLOFENAC SODIUM 30 MG/G
1 GEL TOPICAL
Qty: 0 | Refills: 0 | DISCHARGE
Start: 2023-10-26

## 2023-10-26 RX ORDER — ACETAMINOPHEN 500 MG
3 TABLET ORAL
Qty: 0 | Refills: 0 | DISCHARGE
Start: 2023-10-26

## 2023-10-26 RX ADMIN — DICLOFENAC SODIUM 4 GRAM(S): 30 GEL TOPICAL at 17:05

## 2023-10-26 RX ADMIN — DORZOLAMIDE HYDROCHLORIDE 1 DROP(S): 20 SOLUTION/ DROPS OPHTHALMIC at 05:02

## 2023-10-26 RX ADMIN — ATORVASTATIN CALCIUM 10 MILLIGRAM(S): 80 TABLET, FILM COATED ORAL at 21:29

## 2023-10-26 RX ADMIN — Medication 81 MILLIGRAM(S): at 11:49

## 2023-10-26 RX ADMIN — LATANOPROST 1 DROP(S): 0.05 SOLUTION/ DROPS OPHTHALMIC; TOPICAL at 21:29

## 2023-10-26 RX ADMIN — Medication 1 TABLET(S): at 11:49

## 2023-10-26 RX ADMIN — CARVEDILOL PHOSPHATE 6.25 MILLIGRAM(S): 80 CAPSULE, EXTENDED RELEASE ORAL at 17:05

## 2023-10-26 RX ADMIN — Medication 975 MILLIGRAM(S): at 21:29

## 2023-10-26 RX ADMIN — DICLOFENAC SODIUM 4 GRAM(S): 30 GEL TOPICAL at 05:03

## 2023-10-26 RX ADMIN — Medication 975 MILLIGRAM(S): at 06:46

## 2023-10-26 RX ADMIN — Medication 975 MILLIGRAM(S): at 22:52

## 2023-10-26 RX ADMIN — Medication 975 MILLIGRAM(S): at 13:44

## 2023-10-26 RX ADMIN — DORZOLAMIDE HYDROCHLORIDE 1 DROP(S): 20 SOLUTION/ DROPS OPHTHALMIC at 17:06

## 2023-10-26 RX ADMIN — Medication 975 MILLIGRAM(S): at 05:06

## 2023-10-26 RX ADMIN — CARVEDILOL PHOSPHATE 6.25 MILLIGRAM(S): 80 CAPSULE, EXTENDED RELEASE ORAL at 05:06

## 2023-10-26 RX ADMIN — Medication 975 MILLIGRAM(S): at 14:44

## 2023-10-26 NOTE — DISCHARGE NOTE PROVIDER - CARE PROVIDER_API CALL
Heber Weiss  Orthopaedic Surgery  [address], [address]  [place] [address]  Phone: [phone]  Fax: [phone]  Follow Up Time: 1 week

## 2023-10-26 NOTE — PROGRESS NOTE ADULT - PROBLEM SELECTOR PLAN 4
-Lovenox 40mg SQ Daily. Monitor platelets.   -Hyperkalemia noted but lab sample hemolyzed. -F/u repeat BMP.     5. Discussed plan with patient and her son-in-law at bedside, and DIANA Hicks. DVT: Lovenox 40mg SQ Daily.  Diet: DASH/TLC

## 2023-10-26 NOTE — PROGRESS NOTE ADULT - PROBLEM SELECTOR PLAN 1
-Severe right knee pain and unable to ambulate after hurting it getting out of her car. No acute fracture on X-ray or CT so far. Will get MRI right knee to further eval.   -Tylenol 975mg TID. Will add topical Voltaren BID. Ice packs. Elevation. Can use lidocaine.   -Vitamin D level.   -PT.  -F/u ESR, CRP, uric acid. Severe right knee pain. Edward acute fracture on X-ray or CT. MR showing partial MCL tear. No excessive laxity on exam, only pain with knee flexion but participating well with PT.  - F/u with Dr. Weiss in 1-2 weeks after discharge  - Home PT after working with PT  - No immobilization, WBAT Severe right knee pain. Edward acute fracture on X-ray or CT. MR showing partial MCL tear. No excessive laxity on exam, only pain with knee flexion but participating well with PT.  - F/u with Dr. Weiss in 1-2 weeks after discharge  - Home PT after working with PT  - No immobilization, WBAT per ortho

## 2023-10-26 NOTE — DISCHARGE NOTE PROVIDER - HOSPITAL COURSE
89 y/o female with PMHx of HTN, CAD with stents presents to the ED complaining of right leg pain x few hours. Patient states that she was getting out of the drivers seat of her car when she had sudden onset of pain to the right upper leg. She not fall out of the car. Did not hear a pop. She had to have her family member help her out of car into the house. She felt as though she was unable to ambulate on her own.   In ED received Tylenol and lidocaine.     Patient admitted to general medical floor for further workup. XR and CT of knee unremarkable. However, MRI revealing small partial tear of MCL as well as patellofemoral arthrosis. Patient evaluated by PT and deemed to be a candidate for home PT. Orthopedic surgery consulted who recommended outpatient follow up within one week of discharge. Patient's pain was well controlled with tylenol and topical diclofenac gel. Patient stable for discharge with instructions to follow up with PCP and orthopedic surgery. HPI:  89 y/o female with PMHx of HTN, CAD with stents presents to the ED complaining of right leg pain x few hours. Patient states that she was getting out of the drivers seat of her car when she had sudden onset of pain to the right upper leg. She not fall out of the car. Did not hear a pop. She had to have her family member help her out of car into the house. She felt as though she was unable to ambulate on her own. Normally ambulates  In ED received Tylenol and lidocaine.  (25 Oct 2023 10:55)    Hospital Course:    89 y/o female with PMHx of HTN, CAD with stents presents to the ED complaining of right leg pain x few hours. Patient states that she was getting out of the drivers seat of her car when she had sudden onset of pain to the right upper leg. She not fall out of the car. Did not hear a pop. She had to have her family member help her out of car into the house. She felt as though she was unable to ambulate on her own.   In ED received Tylenol and lidocaine.     Patient admitted to general medical floor for further workup. XR and CT of knee unremarkable. However, MRI revealing small partial tear of MCL as well as patellofemoral arthrosis. Patient evaluated by PT and initially deemed to be a candidate for home PT, but was re-evaluated and was now candidate for [facility]. Orthopedic surgery consulted who recommended outpatient follow up within one week of discharge. Patient's pain was well controlled with tylenol, percocet, and topical diclofenac gel. Patient stable for discharge with instructions to follow up with PCP and orthopedic surgery.     Active or Pending Issues Requiring Follow-up: Follow-up with orthopedic surgery    Advanced Directives:   [ x ] Full code  [ ] DNR  [ ] Hospice    Discharge Diagnoses:    Partial tear of MCL             HPI:  The patient is a 89 y/o female with PMHx of HTN, CAD with stents presents to the ED complaining of right leg pain x few hours. Patient states that she was getting out of the drivers seat of her car when she had sudden onset of pain to the right upper leg. She not fall out of the car. Did not hear a pop. She had to have her family member help her out of car into the house. She felt as though she was unable to ambulate on her own. Normally ambulates  In ED received Tylenol and lidocaine.  (25 Oct 2023 10:55)    Hospital Course:    89 y/o female with PMHx of HTN, CAD with stents presents to the ED complaining of right leg pain x few hours. Patient states that she was getting out of the drivers seat of her car when she had sudden onset of pain to the right upper leg. She not fall out of the car. Did not hear a pop. She had to have her family member help her out of car into the house. She felt as though she was unable to ambulate on her own.   In ED received Tylenol and lidocaine.     Patient admitted to general medical floor for further workup. XR and CT of knee unremarkable. However, MRI revealing small partial tear of MCL as well as patellofemoral arthrosis. Patient evaluated by PT and initially deemed to be a candidate for home PT, but was re-evaluated and was now candidate for [facility]. Orthopedic surgery consulted who recommended outpatient follow up within one week of discharge. Patient's pain was well controlled with tylenol, percocet, and topical diclofenac gel. Patient stable for discharge with instructions to follow up with PCP and orthopedic surgery.     Active or Pending Issues Requiring Follow-up: Follow-up with orthopedic surgery    Advanced Directives:   [ x ] Full code  [ ] DNR  [ ] Hospice    Discharge Diagnoses:    Partial tear of MCL

## 2023-10-26 NOTE — CHART NOTE - NSCHARTNOTEFT_GEN_A_CORE
Spoke with orthopedics resident. Management for small MCL tear would be conservative management. Weight bearing as tolerated and no immobilization. Patient seen by physical therapy while inpatient and PT recommending home PT. Will make appointment with Dr. Weiss within the next 1-2 weeks for follow-up of MCL tear.

## 2023-10-26 NOTE — DISCHARGE NOTE PROVIDER - NSDCCPCAREPLAN_GEN_ALL_CORE_FT
PRINCIPAL DISCHARGE DIAGNOSIS  Diagnosis: Leg pain  Assessment and Plan of Treatment: You were found to have a small partial tear of your MCL which is a ligament in your knee joint. This is likely the cause of your sudden onset pain and decreased mobility. You were evaluated by the phyiscal therapist and your case was discussed with the orthopedic surgeon who recommended no inpatient treatment. As such, you were recommended to follow up outpatient with orthopedic surgery, and to continue with home physical therapy. Please feel free to take tylenol (up to 1000 mg 3 times a day) and apply topical Diclofenac (Voltaren) gel as needed. Please bear weight as tolerated in your legs. Please follow up with your PCP as soon as possible.     PRINCIPAL DISCHARGE DIAGNOSIS  Diagnosis: Leg pain  Assessment and Plan of Treatment: You were found to have a small partial tear of your MCL which is a ligament in your knee joint. This is likely the cause of your sudden onset pain and decreased mobility. You were evaluated by the phyiscal therapist and your case was discussed with the orthopedic surgeon who recommended no inpatient treatment. As such, you were recommended to follow up outpatient with orthopedic surgery, and to go to subacute rehab. Please feel free to take tylenol (up to 1000 mg 3 times a day) and apply topical Diclofenac (Voltaren) gel as needed. Please bear weight as tolerated in your legs. Please follow up with your PCP as soon as possible.

## 2023-10-26 NOTE — PROGRESS NOTE ADULT - SUBJECTIVE AND OBJECTIVE BOX
PATIENT: CHACHA GALLEGO, MRN: [id number]    CHIEF COMPLAINT: Patient is a 89y old  Female who presents with a chief complaint of Right leg pain and difficulty ambulating (25 Oct 2023 10:55)      INTERVAL HISTORY/OVERNIGHT EVENTS: No overnight events.       MEDICATIONS:  MEDICATIONS  (STANDING):  acetaminophen     Tablet .. 975 milliGRAM(s) Oral every 8 hours  aspirin enteric coated 81 milliGRAM(s) Oral daily  atorvastatin 10 milliGRAM(s) Oral at bedtime  carvedilol 6.25 milliGRAM(s) Oral every 12 hours  diclofenac sodium 1% Gel 4 Gram(s) Topical two times a day  dorzolamide 2% Ophthalmic Solution 1 Drop(s) Both EYES <User Schedule>  enoxaparin Injectable 40 milliGRAM(s) SubCutaneous every 24 hours  latanoprost 0.005% Ophthalmic Solution 1 Drop(s) Left EYE at bedtime  multivitamin/minerals 1 Tablet(s) Oral daily    MEDICATIONS  (PRN):  aluminum hydroxide/magnesium hydroxide/simethicone Suspension 30 milliLiter(s) Oral every 4 hours PRN Dyspepsia  melatonin 3 milliGRAM(s) Oral at bedtime PRN Insomnia  ondansetron Injectable 4 milliGRAM(s) IV Push every 8 hours PRN Nausea and/or Vomiting      ALLERGIES: Allergies    No Known Allergies    Intolerances        OBJECTIVE:  ICU Vital Signs Last 24 Hrs  T(C): 36.4 (26 Oct 2023 04:54), Max: 36.8 (25 Oct 2023 13:00)  T(F): 97.6 (26 Oct 2023 04:54), Max: 98.2 (25 Oct 2023 13:00)  HR: 63 (26 Oct 2023 04:54) (63 - 79)  BP: 154/83 (26 Oct 2023 04:54) (111/69 - 154/83)  BP(mean): --  ABP: --  ABP(mean): --  RR: 16 (26 Oct 2023 04:54) (16 - 17)  SpO2: 97% (26 Oct 2023 04:54) (96% - 98%)    O2 Parameters below as of 26 Oct 2023 04:54  Patient On (Oxygen Delivery Method): room air            CAPILLARY BLOOD GLUCOSE        CAPILLARY BLOOD GLUCOSE        I&O's Summary    Daily Height in cm: 157.48 (25 Oct 2023 20:34)    Daily     PHYSICAL EXAMINATION:  General: Comfortable, no acute distress, cooperative with exam.  HEENT: PERRLA  Respiratory: CTAB, normal respiratory effort, no coughing, wheezes, crackles, or rales.  CV: RRR, S1S2, no murmurs, rubs or gallops. No JVD. Distal pulses intact.  Abdominal: Soft, nontender, nondistended, no rebound or guarding, normal bowel sounds.  Neurology: AOx3, no focal neuro defects, NAVARRO x 4.  Extremities: No pitting edema, + Peripheral pulses.      LABS:                          12.7   5.06  )-----------( 132      ( 26 Oct 2023 07:03 )             39.0     10-    143  |  108  |  25<H>  ----------------------------<  109<H>  4.1   |  23  |  0.81    Ca    9.7      25 Oct 2023 13:05    TPro  6.1  /  Alb  3.6  /  TBili  0.4  /  DBili  x   /  AST  44<H>  /  ALT  18  /  AlkPhos  48  10-25    LIVER FUNCTIONS - ( 25 Oct 2023 07:05 )  Alb: 3.6 g/dL / Pro: 6.1 g/dL / ALK PHOS: 48 U/L / ALT: 18 U/L / AST: 44 U/L / GGT: x               CARDIAC MARKERS ( 25 Oct 2023 07:05 )  x     / x     / 85 U/L / x     / x          Urinalysis Basic - ( 26 Oct 2023 07:04 )    Color: Light Yellow / Appearance: Clear / S.018 / pH: x  Gluc: x / Ketone: Negative  / Bili: Negative / Urobili: Negative   Blood: x / Protein: Negative / Nitrite: Negative   Leuk Esterase: Large / RBC: 1 /hpf / WBC 26 /HPF   Sq Epi: x / Non Sq Epi: x / Bacteria: Negative       PATIENT: CHACHA GALLEGO, MRN: [id number]    CHIEF COMPLAINT: Patient is a 89y old  Female who presents with a chief complaint of Right leg pain and difficulty ambulating (25 Oct 2023 10:55)      INTERVAL HISTORY/OVERNIGHT EVENTS: No overnight events. Overall unable to bend R knee fully. She was stepping down from her car and was unable to bend her knee afterwards. Found to have small MCL tear on MRI. Spoke to ortho, and conservative management, WBAT, no immobilization. She walked with PT and was recommended home PT. No fractures noted.       MEDICATIONS:  MEDICATIONS  (STANDING):  acetaminophen     Tablet .. 975 milliGRAM(s) Oral every 8 hours  aspirin enteric coated 81 milliGRAM(s) Oral daily  atorvastatin 10 milliGRAM(s) Oral at bedtime  carvedilol 6.25 milliGRAM(s) Oral every 12 hours  diclofenac sodium 1% Gel 4 Gram(s) Topical two times a day  dorzolamide 2% Ophthalmic Solution 1 Drop(s) Both EYES <User Schedule>  enoxaparin Injectable 40 milliGRAM(s) SubCutaneous every 24 hours  latanoprost 0.005% Ophthalmic Solution 1 Drop(s) Left EYE at bedtime  multivitamin/minerals 1 Tablet(s) Oral daily    MEDICATIONS  (PRN):  aluminum hydroxide/magnesium hydroxide/simethicone Suspension 30 milliLiter(s) Oral every 4 hours PRN Dyspepsia  melatonin 3 milliGRAM(s) Oral at bedtime PRN Insomnia  ondansetron Injectable 4 milliGRAM(s) IV Push every 8 hours PRN Nausea and/or Vomiting      ALLERGIES: Allergies    No Known Allergies    Intolerances        OBJECTIVE:  ICU Vital Signs Last 24 Hrs  T(C): 36.4 (26 Oct 2023 04:54), Max: 36.8 (25 Oct 2023 13:00)  T(F): 97.6 (26 Oct 2023 04:54), Max: 98.2 (25 Oct 2023 13:00)  HR: 63 (26 Oct 2023 04:54) (63 - 79)  BP: 154/83 (26 Oct 2023 04:54) (111/69 - 154/83)  BP(mean): --  ABP: --  ABP(mean): --  RR: 16 (26 Oct 2023 04:54) (16 - 17)  SpO2: 97% (26 Oct 2023 04:54) (96% - 98%)    O2 Parameters below as of 26 Oct 2023 04:54  Patient On (Oxygen Delivery Method): room air            CAPILLARY BLOOD GLUCOSE        CAPILLARY BLOOD GLUCOSE        I&O's Summary    Daily Height in cm: 157.48 (25 Oct 2023 20:34)    Daily     PHYSICAL EXAMINATION:  General: Comfortable, no acute distress, cooperative with exam.  Respiratory: CTAB, normal respiratory effort, no coughing, wheezes, crackles, or rales.  CV: RRR, S1S2, no murmurs, rubs or gallops.  Abdominal: Soft, nontender, nondistended, no rebound or guarding  Neurology: AOx3, no focal neuro defects, NAVARRO x 4. 5/5 strength in R hip, dorsiflexion, plantar flexion, but limited by pain for knee. 5/5 left leg.  Extremities: No pitting edema. No excessive varus/valgus of either knee. No TTP of knees or legs bilaterally. Pain when flexing the R knee. No pain on L side.       LABS:                          12.7   5.06  )-----------( 132      ( 26 Oct 2023 07:03 )             39.0     10-25    143  |  108  |  25<H>  ----------------------------<  109<H>  4.1   |  23  |  0.81    Ca    9.7      25 Oct 2023 13:05    TPro  6.1  /  Alb  3.6  /  TBili  0.4  /  DBili  x   /  AST  44<H>  /  ALT  18  /  AlkPhos  48  10-25    LIVER FUNCTIONS - ( 25 Oct 2023 07:05 )  Alb: 3.6 g/dL / Pro: 6.1 g/dL / ALK PHOS: 48 U/L / ALT: 18 U/L / AST: 44 U/L / GGT: x               CARDIAC MARKERS ( 25 Oct 2023 07:05 )  x     / x     / 85 U/L / x     / x          Urinalysis Basic - ( 26 Oct 2023 07:04 )    Color: Light Yellow / Appearance: Clear / S.018 / pH: x  Gluc: x / Ketone: Negative  / Bili: Negative / Urobili: Negative   Blood: x / Protein: Negative / Nitrite: Negative   Leuk Esterase: Large / RBC: 1 /hpf / WBC 26 /HPF   Sq Epi: x / Non Sq Epi: x / Bacteria: Negative

## 2023-10-26 NOTE — DISCHARGE NOTE PROVIDER - NSDCMRMEDTOKEN_GEN_ALL_CORE_FT
acetaminophen 325 mg oral tablet: 3 tab(s) orally every 8 hours  aspirin 81 mg oral tablet: 2 tab(s) orally once a day  atorvastatin 10 mg oral tablet: 1 tab(s) orally once a day  carvedilol 6.25 mg oral tablet: 1 tab(s) orally 2 times a day  Dhaval Lemus Women&#x27;s Multivitamins: 1 tablet orally once a day  diclofenac 1% topical gel: Apply topically to affected area 3 times a day as needed for  pain  dorzolamide 2% ophthalmic solution: 1 drop(s) in the left eye 2 times a day  latanoprost 0.005% ophthalmic solution: 1 drop(s) in the left eye once a day

## 2023-10-26 NOTE — DISCHARGE NOTE PROVIDER - NSDCFUSCHEDAPPT_GEN_ALL_CORE_FT
Heber Weiss  [facility] Physician [facility]  ORTHOSURG [address] [facility]  Scheduled Appointment: 11/06/2023    [facility]  [facility]  Scheduled Appointment: 11/13/2023

## 2023-10-27 PROBLEM — I10 ESSENTIAL (PRIMARY) HYPERTENSION: Chronic | Status: ACTIVE | Noted: 2023-10-25

## 2023-10-27 PROBLEM — I25.10 ATHEROSCLEROTIC HEART DISEASE OF NATIVE CORONARY ARTERY WITHOUT ANGINA PECTORIS: Chronic | Status: ACTIVE | Noted: 2023-10-25

## 2023-10-27 PROBLEM — H40.9 UNSPECIFIED GLAUCOMA: Chronic | Status: ACTIVE | Noted: 2023-10-25

## 2023-10-27 PROBLEM — M19.90 UNSPECIFIED OSTEOARTHRITIS, UNSPECIFIED SITE: Chronic | Status: ACTIVE | Noted: 2023-10-25

## 2023-10-27 PROCEDURE — 99232 SBSQ HOSP IP/OBS MODERATE 35: CPT

## 2023-10-27 RX ORDER — CHLORHEXIDINE GLUCONATE 213 G/1000ML
1 SOLUTION TOPICAL DAILY
Refills: 0 | Status: DISCONTINUED | OUTPATIENT
Start: 2023-10-27 | End: 2023-10-28

## 2023-10-27 RX ORDER — OXYCODONE AND ACETAMINOPHEN 5; 325 MG/1; MG/1
1 TABLET ORAL EVERY 6 HOURS
Refills: 0 | Status: DISCONTINUED | OUTPATIENT
Start: 2023-10-27 | End: 2023-10-28

## 2023-10-27 RX ORDER — DORZOLAMIDE HYDROCHLORIDE 20 MG/ML
1 SOLUTION/ DROPS OPHTHALMIC
Refills: 0 | Status: DISCONTINUED | OUTPATIENT
Start: 2023-10-27 | End: 2023-10-28

## 2023-10-27 RX ADMIN — Medication 975 MILLIGRAM(S): at 21:08

## 2023-10-27 RX ADMIN — CARVEDILOL PHOSPHATE 6.25 MILLIGRAM(S): 80 CAPSULE, EXTENDED RELEASE ORAL at 18:25

## 2023-10-27 RX ADMIN — CARVEDILOL PHOSPHATE 6.25 MILLIGRAM(S): 80 CAPSULE, EXTENDED RELEASE ORAL at 05:39

## 2023-10-27 RX ADMIN — DICLOFENAC SODIUM 4 GRAM(S): 30 GEL TOPICAL at 05:39

## 2023-10-27 RX ADMIN — Medication 975 MILLIGRAM(S): at 05:39

## 2023-10-27 RX ADMIN — Medication 975 MILLIGRAM(S): at 06:09

## 2023-10-27 RX ADMIN — ENOXAPARIN SODIUM 40 MILLIGRAM(S): 100 INJECTION SUBCUTANEOUS at 12:20

## 2023-10-27 RX ADMIN — DORZOLAMIDE HYDROCHLORIDE 1 DROP(S): 20 SOLUTION/ DROPS OPHTHALMIC at 18:24

## 2023-10-27 RX ADMIN — ATORVASTATIN CALCIUM 10 MILLIGRAM(S): 80 TABLET, FILM COATED ORAL at 21:09

## 2023-10-27 RX ADMIN — Medication 81 MILLIGRAM(S): at 12:19

## 2023-10-27 RX ADMIN — Medication 975 MILLIGRAM(S): at 13:38

## 2023-10-27 RX ADMIN — LATANOPROST 1 DROP(S): 0.05 SOLUTION/ DROPS OPHTHALMIC; TOPICAL at 21:07

## 2023-10-27 RX ADMIN — DORZOLAMIDE HYDROCHLORIDE 1 DROP(S): 20 SOLUTION/ DROPS OPHTHALMIC at 05:39

## 2023-10-27 RX ADMIN — DICLOFENAC SODIUM 4 GRAM(S): 30 GEL TOPICAL at 18:24

## 2023-10-27 RX ADMIN — Medication 1 TABLET(S): at 12:19

## 2023-10-27 NOTE — PROGRESS NOTE ADULT - PROBLEM SELECTOR PLAN 1
Severe right knee pain. Edward acute fracture on X-ray or CT. MR showing partial MCL tear. No excessive laxity on exam, only pain with knee flexion but participating well with PT.  - F/u with Dr. Weiss in 1-2 weeks after discharge  - Home PT after working with PT  - No immobilization, WBAT per ortho Severe right knee pain. Edward acute fracture on X-ray or CT. MR showing partial MCL tear. No excessive laxity on exam, only pain with knee flexion but participating well with PT.  - F/u with Dr. Weiss in 1-2 weeks after discharge  - Re-eval with PT for possible RAULITO placement  - No immobilization, WBAT per ortho

## 2023-10-27 NOTE — PROGRESS NOTE ADULT - SUBJECTIVE AND OBJECTIVE BOX
PATIENT: CHACHA GALLEGO, MRN: [id number]    CHIEF COMPLAINT: Patient is a 89y old  Female who presents with a chief complaint of Right leg pain and difficulty ambulating (26 Oct 2023 14:12)      INTERVAL HISTORY/OVERNIGHT EVENTS: No overnight events.       MEDICATIONS:  MEDICATIONS  (STANDING):  acetaminophen     Tablet .. 975 milliGRAM(s) Oral every 8 hours  aspirin enteric coated 81 milliGRAM(s) Oral daily  atorvastatin 10 milliGRAM(s) Oral at bedtime  carvedilol 6.25 milliGRAM(s) Oral every 12 hours  diclofenac sodium 1% Gel 4 Gram(s) Topical two times a day  dorzolamide 2% Ophthalmic Solution 1 Drop(s) Both EYES <User Schedule>  enoxaparin Injectable 40 milliGRAM(s) SubCutaneous every 24 hours  latanoprost 0.005% Ophthalmic Solution 1 Drop(s) Left EYE at bedtime  multivitamin/minerals 1 Tablet(s) Oral daily    MEDICATIONS  (PRN):  aluminum hydroxide/magnesium hydroxide/simethicone Suspension 30 milliLiter(s) Oral every 4 hours PRN Dyspepsia  melatonin 3 milliGRAM(s) Oral at bedtime PRN Insomnia  ondansetron Injectable 4 milliGRAM(s) IV Push every 8 hours PRN Nausea and/or Vomiting      ALLERGIES: Allergies    No Known Allergies    Intolerances        OBJECTIVE:  ICU Vital Signs Last 24 Hrs  T(C): 36.8 (27 Oct 2023 04:36), Max: 36.8 (27 Oct 2023 04:36)  T(F): 98.3 (27 Oct 2023 04:36), Max: 98.3 (27 Oct 2023 04:36)  HR: 67 (27 Oct 2023 04:36) (67 - 79)  BP: 148/72 (27 Oct 2023 04:36) (120/64 - 148/72)  BP(mean): --  ABP: --  ABP(mean): --  RR: 18 (27 Oct 2023 04:36) (18 - 18)  SpO2: 95% (27 Oct 2023 04:36) (95% - 98%)    O2 Parameters below as of 27 Oct 2023 04:36  Patient On (Oxygen Delivery Method): room air            CAPILLARY BLOOD GLUCOSE        CAPILLARY BLOOD GLUCOSE        I&O's Summary    26 Oct 2023 07:01  -  27 Oct 2023 06:55  --------------------------------------------------------  IN: 480 mL / OUT: 0 mL / NET: 480 mL      Daily     Daily     PHYSICAL EXAMINATION:  General: Comfortable, no acute distress, cooperative with exam.  HEENT: PERRLA  Respiratory: CTAB, normal respiratory effort, no coughing, wheezes, crackles, or rales.  CV: RRR, S1S2, no murmurs, rubs or gallops. No JVD. Distal pulses intact.  Abdominal: Soft, nontender, nondistended, no rebound or guarding, normal bowel sounds.  Neurology: AOx3, no focal neuro defects, NAVARRO x 4.  Extremities: No pitting edema, + Peripheral pulses.      LABS:                          12.7   5.06  )-----------( 132      ( 26 Oct 2023 07:03 )             39.0     10-26    142  |  110<H>  |  25<H>  ----------------------------<  93  4.1   |  21<L>  |  0.87    Ca    9.5      26 Oct 2023 07:03    TPro  6.1  /  Alb  3.6  /  TBili  0.4  /  DBili  x   /  AST  44<H>  /  ALT  18  /  AlkPhos  48  10-25    LIVER FUNCTIONS - ( 25 Oct 2023 07:05 )  Alb: 3.6 g/dL / Pro: 6.1 g/dL / ALK PHOS: 48 U/L / ALT: 18 U/L / AST: 44 U/L / GGT: x               CARDIAC MARKERS ( 25 Oct 2023 07:05 )  x     / x     / 85 U/L / x     / x          Urinalysis Basic - ( 26 Oct 2023 07:04 )    Color: Light Yellow / Appearance: Clear / S.018 / pH: x  Gluc: x / Ketone: Negative  / Bili: Negative / Urobili: Negative   Blood: x / Protein: Negative / Nitrite: Negative   Leuk Esterase: Large / RBC: 1 /hpf / WBC 26 /HPF   Sq Epi: x / Non Sq Epi: x / Bacteria: Negative       PATIENT: CHACHA GALLEGO, MRN: [id number]    CHIEF COMPLAINT: Patient is a 89y old  Female who presents with a chief complaint of Right leg pain and difficulty ambulating (26 Oct 2023 14:12)      INTERVAL HISTORY/OVERNIGHT EVENTS: No overnight events. Overall doing well, but still with pain in R knee when bending leg. Was able to walk well with PT, but unable to stand or sit on her own. Daughter worried about this.      MEDICATIONS:  MEDICATIONS  (STANDING):  acetaminophen     Tablet .. 975 milliGRAM(s) Oral every 8 hours  aspirin enteric coated 81 milliGRAM(s) Oral daily  atorvastatin 10 milliGRAM(s) Oral at bedtime  carvedilol 6.25 milliGRAM(s) Oral every 12 hours  diclofenac sodium 1% Gel 4 Gram(s) Topical two times a day  dorzolamide 2% Ophthalmic Solution 1 Drop(s) Both EYES <User Schedule>  enoxaparin Injectable 40 milliGRAM(s) SubCutaneous every 24 hours  latanoprost 0.005% Ophthalmic Solution 1 Drop(s) Left EYE at bedtime  multivitamin/minerals 1 Tablet(s) Oral daily    MEDICATIONS  (PRN):  aluminum hydroxide/magnesium hydroxide/simethicone Suspension 30 milliLiter(s) Oral every 4 hours PRN Dyspepsia  melatonin 3 milliGRAM(s) Oral at bedtime PRN Insomnia  ondansetron Injectable 4 milliGRAM(s) IV Push every 8 hours PRN Nausea and/or Vomiting      ALLERGIES: Allergies    No Known Allergies    Intolerances        OBJECTIVE:  ICU Vital Signs Last 24 Hrs  T(C): 36.8 (27 Oct 2023 04:36), Max: 36.8 (27 Oct 2023 04:36)  T(F): 98.3 (27 Oct 2023 04:36), Max: 98.3 (27 Oct 2023 04:36)  HR: 67 (27 Oct 2023 04:36) (67 - 79)  BP: 148/72 (27 Oct 2023 04:36) (120/64 - 148/72)  BP(mean): --  ABP: --  ABP(mean): --  RR: 18 (27 Oct 2023 04:36) (18 - 18)  SpO2: 95% (27 Oct 2023 04:36) (95% - 98%)    O2 Parameters below as of 27 Oct 2023 04:36  Patient On (Oxygen Delivery Method): room air            CAPILLARY BLOOD GLUCOSE        CAPILLARY BLOOD GLUCOSE        I&O's Summary    26 Oct 2023 07:01  -  27 Oct 2023 06:55  --------------------------------------------------------  IN: 480 mL / OUT: 0 mL / NET: 480 mL      Daily     Daily     PHYSICAL EXAMINATION:  General: Comfortable, no acute distress, cooperative with exam.  Respiratory: CTAB, normal respiratory effort, no coughing, wheezes, crackles, or rales.  CV: RRR, S1S2, no murmurs, rubs or gallops.  Abdominal: Soft, nontender, nondistended, no rebound or guarding  Neurology: AOx3, no focal neuro defects, NAVARRO x 4.  Extremities: No pitting edema. No TTP of R knee or L knee. Strength 5/5 in bilateral hips. ROM limited in R knee due to pain.      LABS:                          12.7   5.06  )-----------( 132      ( 26 Oct 2023 07:03 )             39.0     10-26    142  |  110<H>  |  25<H>  ----------------------------<  93  4.1   |  21<L>  |  0.87    Ca    9.5      26 Oct 2023 07:03    TPro  6.1  /  Alb  3.6  /  TBili  0.4  /  DBili  x   /  AST  44<H>  /  ALT  18  /  AlkPhos  48  10-25    LIVER FUNCTIONS - ( 25 Oct 2023 07:05 )  Alb: 3.6 g/dL / Pro: 6.1 g/dL / ALK PHOS: 48 U/L / ALT: 18 U/L / AST: 44 U/L / GGT: x               CARDIAC MARKERS ( 25 Oct 2023 07:05 )  x     / x     / 85 U/L / x     / x          Urinalysis Basic - ( 26 Oct 2023 07:04 )    Color: Light Yellow / Appearance: Clear / S.018 / pH: x  Gluc: x / Ketone: Negative  / Bili: Negative / Urobili: Negative   Blood: x / Protein: Negative / Nitrite: Negative   Leuk Esterase: Large / RBC: 1 /hpf / WBC 26 /HPF   Sq Epi: x / Non Sq Epi: x / Bacteria: Negative

## 2023-10-28 ENCOUNTER — TRANSCRIPTION ENCOUNTER (OUTPATIENT)
Age: 88
End: 2023-10-28

## 2023-10-28 VITALS
DIASTOLIC BLOOD PRESSURE: 73 MMHG | SYSTOLIC BLOOD PRESSURE: 129 MMHG | OXYGEN SATURATION: 97 % | HEART RATE: 78 BPM | TEMPERATURE: 98 F | RESPIRATION RATE: 16 BRPM

## 2023-10-28 LAB
MRSA PCR RESULT.: SIGNIFICANT CHANGE UP
MRSA PCR RESULT.: SIGNIFICANT CHANGE UP
S AUREUS DNA NOSE QL NAA+PROBE: SIGNIFICANT CHANGE UP
S AUREUS DNA NOSE QL NAA+PROBE: SIGNIFICANT CHANGE UP

## 2023-10-28 PROCEDURE — 99239 HOSP IP/OBS DSCHRG MGMT >30: CPT

## 2023-10-28 RX ADMIN — Medication 81 MILLIGRAM(S): at 11:13

## 2023-10-28 RX ADMIN — CHLORHEXIDINE GLUCONATE 1 APPLICATION(S): 213 SOLUTION TOPICAL at 11:12

## 2023-10-28 RX ADMIN — CARVEDILOL PHOSPHATE 6.25 MILLIGRAM(S): 80 CAPSULE, EXTENDED RELEASE ORAL at 05:12

## 2023-10-28 RX ADMIN — Medication 1 TABLET(S): at 11:13

## 2023-10-28 RX ADMIN — DORZOLAMIDE HYDROCHLORIDE 1 DROP(S): 20 SOLUTION/ DROPS OPHTHALMIC at 05:12

## 2023-10-28 RX ADMIN — DICLOFENAC SODIUM 4 GRAM(S): 30 GEL TOPICAL at 05:12

## 2023-10-28 RX ADMIN — Medication 975 MILLIGRAM(S): at 05:11

## 2023-10-28 RX ADMIN — ENOXAPARIN SODIUM 40 MILLIGRAM(S): 100 INJECTION SUBCUTANEOUS at 12:02

## 2023-10-28 NOTE — PROGRESS NOTE ADULT - ATTENDING COMMENTS
89F with h/o HTN, CAD with stents; presents to the ED complaining of right knee pain while coming out of car 2 days ago, no fall or trauma, having difficulty ambulating. MRI shows partial tear of R MCL knee joint.    1. Pain in R knee 2/2 R MCL tear with difficulty ambulation  2. CAD with stents  3. HTN    - Still having difficulty bending R Knee and ambulation. MRI reviewed- partial tear of MCL, no Fx.  - Orthopedic Sx refused to see her inpatient but will see her outpatient in a week, recommends WBAT  - PT re-evaluated- RAULITO recommended  - c/w analgesics prn  - c/w all her home meds  - DVT ppx .  Discharge to [facility]
The patient is a 89F with h/o HTN, CAD with stents; presents to the ED complaining of right knee pain while coming out of car 2 days ago, no fall or trauma, having difficulty ambulating. MRI shows partial tear of R MCL knee joint.    1. Pain in R knee 2/2 R MCL tear with difficulty ambulation  2. CAD with stents  3. HTN    - Still having difficulty bending R Knee and ambulation. MRI reviewed- partial tear of MCL, no Fx.  - Orthopedic Sx refused to see her inpatient but will see her outpatient in a week, recommends WBAT  - PT re-evaluated- RAULITO recommended  - c/w analgesics prn  - c/w all her home meds  - DVT ppx .  - CM is working on Rehab placement  ** Team spoke to daughter
The patient is a 89F with h/o HTN, CAD with stents; presents to the ED complaining of right knee pain while coming out of car 2 days ago, no fall or trauma, having difficulty ambulating. MRI shows partial tear of R MCL knee joint.    1. Pain in R knee 2/2 R MCL tear with difficulty ambulation  2. CAD with stents  3. HTN    - Having difficulty bending R Knee and ambulation. MRI reviewed- partial tear of MCL, no Fx.  - Orthopedic Sx refused to see her inpatient but will see her outpatient in a week, recommends WBAT  - PT eval- Home PT  - Family requested re-yoni by PT tomorrow  - c/w analgesics prn  - c/w hher home meds  - DVT ppx

## 2023-10-28 NOTE — DISCHARGE NOTE NURSING/CASE MANAGEMENT/SOCIAL WORK - PATIENT PORTAL LINK FT
You can access the FollowMyHealth Patient Portal offered by [facility] by registering at the following website: [email or web address]. By joining [employer]’s FollowMyHealth portal, you will also be able to view your health information using other applications (apps) compatible with our system.

## 2023-10-28 NOTE — PROGRESS NOTE ADULT - PROBLEM SELECTOR PLAN 1
Severe right knee pain. Edward acute fracture on X-ray or CT. MR showing partial MCL tear. No excessive laxity on exam, only pain with knee flexion but participating well with PT.  - F/u with Dr. Weiss in 1-2 weeks after discharge  - Re-eval with PT for possible RAULITO placement  - No immobilization, WBAT per ortho

## 2023-10-28 NOTE — PROGRESS NOTE ADULT - PROBLEM SELECTOR PLAN 2
-C/w home ASA, statin, and carvedilol.  -DASH diet.

## 2023-10-28 NOTE — PROGRESS NOTE ADULT - SUBJECTIVE AND OBJECTIVE BOX
Carolina NydiaOksanaNeelam | PGY-3  Internal Medicine    OVERNIGHT EVENTS: no overnight events      SUBJECTIVE: Patient was examined at bedside this morning. Appeared comfortable. Overnight vitals and monitoring results were reviewed. Reporting no complaints. Denied chest pain, SOB, abdominal pain, vomiting, diarrhea, constipation.       MEDICATIONS  (STANDING):  acetaminophen     Tablet .. 975 milliGRAM(s) Oral every 8 hours  aspirin enteric coated 81 milliGRAM(s) Oral daily  atorvastatin 10 milliGRAM(s) Oral at bedtime  carvedilol 6.25 milliGRAM(s) Oral every 12 hours  chlorhexidine 4% Liquid 1 Application(s) Topical daily  diclofenac sodium 1% Gel 4 Gram(s) Topical two times a day  dorzolamide 2% Ophthalmic Solution 1 Drop(s) Left EYE <User Schedule>  enoxaparin Injectable 40 milliGRAM(s) SubCutaneous every 24 hours  latanoprost 0.005% Ophthalmic Solution 1 Drop(s) Left EYE at bedtime  multivitamin/minerals 1 Tablet(s) Oral daily    MEDICATIONS  (PRN):  aluminum hydroxide/magnesium hydroxide/simethicone Suspension 30 milliLiter(s) Oral every 4 hours PRN Dyspepsia  melatonin 3 milliGRAM(s) Oral at bedtime PRN Insomnia  ondansetron Injectable 4 milliGRAM(s) IV Push every 8 hours PRN Nausea and/or Vomiting  oxycodone    5 mG/acetaminophen 325 mG 1 Tablet(s) Oral every 6 hours PRN Severe Pain (7 - 10)        T(F): 97.6 (10-28-23 @ 04:21), Max: 98.9 (10-27-23 @ 12:02)  HR: 76 (10-28-23 @ 04:21) (70 - 77)  BP: 141/67 (10-28-23 @ 04:21) (112/55 - 141/67)  BP(mean): --  RR: 18 (10-28-23 @ 04:21) (18 - 18)  SpO2: 97% (10-28-23 @ 04:21) (97% - 97%)    PHYSICAL EXAM:     GENERAL: NAD, lying in bed comfortably  HEAD:  Atraumatic, Normocephalic  EYES: EOMI, PERRLA, conjunctiva and sclera clear, no nystagmus noted  CHEST/LUNG: Clear to auscultation bilaterally; No rales, rhonchi, wheezing, or rubs. Unlabored respirations  HEART: Regular rate and rhythm; No murmurs, rubs, or gallops, normal S1/S2  ABDOMEN: normal bowel sounds; Soft, nontender, nondistended, no organomegaly   EXTREMITIES:  2+ Peripheral Pulses, brisk capillary refill. No clubbing, cyanosis, or edema  Neurological:  A&Ox3, no focal deficits       TELEMETRY:    LABS:                        12.7   5.06  )-----------( 132      ( 26 Oct 2023 07:03 )             39.0     10-26    142  |  110<H>  |  25<H>  ----------------------------<  93  4.1   |  21<L>  |  0.87    Ca    9.5      26 Oct 2023 07:03              Creatinine Trend: 0.87<--, 0.81<--, 0.76<--  I&O's Summary    26 Oct 2023 07:01  -  27 Oct 2023 07:00  --------------------------------------------------------  IN: 480 mL / OUT: 0 mL / NET: 480 mL    27 Oct 2023 07:01  -  28 Oct 2023 06:52  --------------------------------------------------------  IN: 480 mL / OUT: 0 mL / NET: 480 mL      BNP    RADIOLOGY & ADDITIONAL STUDIES:

## 2023-10-28 NOTE — PROGRESS NOTE ADULT - ASSESSMENT
89 y/o female with PMHx of HTN, CAD with stents; presents to the ED complaining of right leg pain x few hours causing difficulty ambulating. Admitted for further mgmt. 

## 2023-10-28 NOTE — PROGRESS NOTE ADULT - REASON FOR ADMISSION
Right leg pain and difficulty ambulating

## 2023-10-28 NOTE — DISCHARGE NOTE NURSING/CASE MANAGEMENT/SOCIAL WORK - NSDCPEFALRISK_GEN_ALL_CORE
For information on Fall & Injury Prevention, visit: [email or web address] OR  [email or web address] OR  https://www.cdc.gov/steadi/patient.html

## 2023-11-06 ENCOUNTER — APPOINTMENT (OUTPATIENT)
Dept: ORTHOPEDIC SURGERY | Facility: CLINIC | Age: 88
End: 2023-11-06

## 2023-11-06 ENCOUNTER — APPOINTMENT (OUTPATIENT)
Dept: ORTHOPEDIC SURGERY | Facility: CLINIC | Age: 88
End: 2023-11-06
Payer: MEDICARE

## 2023-11-06 VITALS
SYSTOLIC BLOOD PRESSURE: 116 MMHG | DIASTOLIC BLOOD PRESSURE: 69 MMHG | WEIGHT: 132 LBS | HEIGHT: 62 IN | BODY MASS INDEX: 24.29 KG/M2 | HEART RATE: 78 BPM

## 2023-11-06 DIAGNOSIS — M25.561 PAIN IN RIGHT KNEE: ICD-10-CM

## 2023-11-06 DIAGNOSIS — S83.411A SPRAIN OF MEDIAL COLLATERAL LIGAMENT OF RIGHT KNEE, INITIAL ENCOUNTER: ICD-10-CM

## 2023-11-06 PROCEDURE — 99214 OFFICE O/P EST MOD 30 MIN: CPT

## 2023-11-13 ENCOUNTER — APPOINTMENT (OUTPATIENT)
Dept: INTERNAL MEDICINE | Facility: CLINIC | Age: 88
End: 2023-11-13
Payer: MEDICARE

## 2023-11-13 ENCOUNTER — OUTPATIENT (OUTPATIENT)
Dept: OUTPATIENT SERVICES | Facility: HOSPITAL | Age: 88
LOS: 1 days | End: 2023-11-13
Payer: COMMERCIAL

## 2023-11-13 VITALS
BODY MASS INDEX: 24.66 KG/M2 | HEART RATE: 80 BPM | SYSTOLIC BLOOD PRESSURE: 140 MMHG | WEIGHT: 134 LBS | HEIGHT: 62 IN | DIASTOLIC BLOOD PRESSURE: 64 MMHG | OXYGEN SATURATION: 98 %

## 2023-11-13 DIAGNOSIS — Z96.643 PRESENCE OF ARTIFICIAL HIP JOINT, BILATERAL: Chronic | ICD-10-CM

## 2023-11-13 DIAGNOSIS — Z87.891 PERSONAL HISTORY OF NICOTINE DEPENDENCE: ICD-10-CM

## 2023-11-13 DIAGNOSIS — Z82.49 FAMILY HISTORY OF ISCHEMIC HEART DISEASE AND OTHER DISEASES OF THE CIRCULATORY SYSTEM: ICD-10-CM

## 2023-11-13 DIAGNOSIS — F17.200 NICOTINE DEPENDENCE, UNSPECIFIED, UNCOMPLICATED: ICD-10-CM

## 2023-11-13 PROCEDURE — 93971 EXTREMITY STUDY: CPT

## 2023-11-13 PROCEDURE — 84145 PROCALCITONIN (PCT): CPT

## 2023-11-13 PROCEDURE — 80053 COMPREHEN METABOLIC PANEL: CPT

## 2023-11-13 PROCEDURE — 84550 ASSAY OF BLOOD/URIC ACID: CPT

## 2023-11-13 PROCEDURE — 82550 ASSAY OF CK (CPK): CPT

## 2023-11-13 PROCEDURE — 84443 ASSAY THYROID STIM HORMONE: CPT

## 2023-11-13 PROCEDURE — 76377 3D RENDER W/INTRP POSTPROCES: CPT

## 2023-11-13 PROCEDURE — 73502 X-RAY EXAM HIP UNI 2-3 VIEWS: CPT

## 2023-11-13 PROCEDURE — 82306 VITAMIN D 25 HYDROXY: CPT

## 2023-11-13 PROCEDURE — 97110 THERAPEUTIC EXERCISES: CPT

## 2023-11-13 PROCEDURE — 87640 STAPH A DNA AMP PROBE: CPT

## 2023-11-13 PROCEDURE — 80048 BASIC METABOLIC PNL TOTAL CA: CPT

## 2023-11-13 PROCEDURE — 73721 MRI JNT OF LWR EXTRE W/O DYE: CPT

## 2023-11-13 PROCEDURE — 81001 URINALYSIS AUTO W/SCOPE: CPT

## 2023-11-13 PROCEDURE — 97116 GAIT TRAINING THERAPY: CPT

## 2023-11-13 PROCEDURE — G0463: CPT

## 2023-11-13 PROCEDURE — 73700 CT LOWER EXTREMITY W/O DYE: CPT | Mod: MA

## 2023-11-13 PROCEDURE — 73590 X-RAY EXAM OF LOWER LEG: CPT

## 2023-11-13 PROCEDURE — 83036 HEMOGLOBIN GLYCOSYLATED A1C: CPT

## 2023-11-13 PROCEDURE — 86140 C-REACTIVE PROTEIN: CPT

## 2023-11-13 PROCEDURE — 73551 X-RAY EXAM OF FEMUR 1: CPT

## 2023-11-13 PROCEDURE — 87641 MR-STAPH DNA AMP PROBE: CPT

## 2023-11-13 PROCEDURE — 99213 OFFICE O/P EST LOW 20 MIN: CPT

## 2023-11-13 PROCEDURE — 97161 PT EVAL LOW COMPLEX 20 MIN: CPT

## 2023-11-13 PROCEDURE — 73562 X-RAY EXAM OF KNEE 3: CPT

## 2023-11-13 PROCEDURE — 85027 COMPLETE CBC AUTOMATED: CPT

## 2023-11-13 PROCEDURE — 76882 US LMTD JT/FCL EVL NVASC XTR: CPT

## 2023-11-13 PROCEDURE — 82607 VITAMIN B-12: CPT

## 2023-11-13 PROCEDURE — 99285 EMERGENCY DEPT VISIT HI MDM: CPT

## 2023-11-13 PROCEDURE — 85652 RBC SED RATE AUTOMATED: CPT

## 2023-11-13 RX ORDER — ATORVASTATIN CALCIUM 20 MG/1
20 TABLET, FILM COATED ORAL
Refills: 0 | Status: DISCONTINUED | COMMUNITY
End: 2023-11-13

## 2023-11-13 RX ORDER — LATANOPROST/PF 0.005 %
0.01 DROPS OPHTHALMIC (EYE)
Refills: 0 | Status: ACTIVE | COMMUNITY

## 2023-11-13 RX ORDER — ATORVASTATIN CALCIUM 10 MG/1
10 TABLET, FILM COATED ORAL
Refills: 0 | Status: ACTIVE | COMMUNITY

## 2023-11-13 RX ORDER — ASPIRIN 81 MG
81 TABLET, DELAYED RELEASE (ENTERIC COATED) ORAL DAILY
Refills: 0 | Status: ACTIVE | COMMUNITY

## 2023-11-13 RX ORDER — CARVEDILOL 6.25 MG/1
6.25 TABLET, FILM COATED ORAL TWICE DAILY
Refills: 0 | Status: ACTIVE | COMMUNITY

## 2023-11-27 DIAGNOSIS — I10 ESSENTIAL (PRIMARY) HYPERTENSION: ICD-10-CM

## 2023-12-04 ENCOUNTER — APPOINTMENT (OUTPATIENT)
Dept: ORTHOPEDIC SURGERY | Facility: CLINIC | Age: 88
End: 2023-12-04

## 2024-01-29 ENCOUNTER — APPOINTMENT (OUTPATIENT)
Dept: ORTHOPEDIC SURGERY | Facility: CLINIC | Age: 89
End: 2024-01-29
Payer: MEDICARE

## 2024-01-29 VITALS
DIASTOLIC BLOOD PRESSURE: 62 MMHG | SYSTOLIC BLOOD PRESSURE: 120 MMHG | BODY MASS INDEX: 24.66 KG/M2 | HEIGHT: 62 IN | HEART RATE: 76 BPM | WEIGHT: 134 LBS

## 2024-01-29 DIAGNOSIS — M17.11 UNILATERAL PRIMARY OSTEOARTHRITIS, RIGHT KNEE: ICD-10-CM

## 2024-01-29 DIAGNOSIS — M25.531 PAIN IN RIGHT WRIST: ICD-10-CM

## 2024-01-29 PROCEDURE — 73110 X-RAY EXAM OF WRIST: CPT | Mod: RT

## 2024-01-29 PROCEDURE — 99214 OFFICE O/P EST MOD 30 MIN: CPT

## 2024-01-29 NOTE — PHYSICAL EXAM
[Slightly Antalgic] : slightly antalgic [Wheelchair] : uses a wheelchair [UE/LE] : Sensory: Intact in bilateral upper & lower extremities [DP] : dorsalis pedis 2+ and symmetric bilaterally [PT] : posterior tibial 2+ and symmetric bilaterally [Rad] : radial 2+ and symmetric bilaterally [Normal] : Alert and in no acute distress [Poor Appearance] : well-appearing [Acute Distress] : not in acute distress [Obese] : not obese [de-identified] : The patient has no respiratory distress. Mood and affect are normal. The patient is alert and oriented to person, place and time. There is no pain with active or passive motion of the hips.  There is no tenderness of either hip.  Examination of the right knee demonstrates mild medial tenderness.  There is no instability.  Range of motion 0 to 100 degrees.  Calves are soft and nontender.  The skin is intact.  There is no lymphedema. There is no pain with motion of the right shoulder or elbow.  There is pain with motion of the right wrist.  Tendon function is intact.  Tinel's signs are negative. [de-identified] : AP, lateral and oblique x-rays of the right wrist demonstrate no fracture or dislocation.  There are degenerative changes of the wrist.  There are soft tissue calcifications on the volar surface of the wrist.

## 2024-01-29 NOTE — DISCUSSION/SUMMARY
[de-identified] : The patient has had improvement in the condition of her right knee.  Her MCL has resolved.  She will work on a home exercise program for strengthening of her knee and leg.  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  She is given a wrist immobilizer for support.  She will work on home exercises.  If symptomatic in 1 month she will return.

## 2024-01-29 NOTE — HISTORY OF PRESENT ILLNESS
Called pt to notify that their insurance requires a PA for CPAP. Submitted PA on their behalf and [facility] will follow up with pt when a decision has been made by insurance.  
[de-identified] : The patient presents for reevaluation of right knee MCL sprain. She just finished PT with good improvement. She has continued on a HEP. She has intermittent pain with getting in and out of the car. She takes Tylenol as needed for pain.  She also reports that for approximately 1 month she has been experiencing some pain in her right wrist.  She uses her hands to push up when she is sitting and may have aggravated her right wrist at that time.  She has pain with motion.  She did not fall.  There is no prior history of right wrist pain or injury.

## 2024-03-25 NOTE — PATIENT PROFILE ADULT - NSPROPASSIVESMOKEEXPOSURE_GEN_A_NUR
Patient: Randall J Jennissen    Procedure: Procedure(s):  Colonoscopy       Diagnosis: Screen for colon cancer [Z12.11]  Diagnosis Additional Information: No value filed.    Anesthesia Type:   General     Note:    Oropharynx: spontaneously breathing and oropharynx clear of all foreign objects  Level of Consciousness: awake  Oxygen Supplementation: room air    Independent Airway: airway patency satisfactory and stable  Dentition: dentition unchanged  Vital Signs Stable: post-procedure vital signs reviewed and stable  Report to RN Given: handoff report given  Patient transferred to: Phase II    Handoff Report: Identifed the Patient, Identified the Reponsible Provider, Reviewed the pertinent medical history, Discussed the surgical course, Reviewed Intra-OP anesthesia mangement and issues during anesthesia, Set expectations for post-procedure period and Allowed opportunity for questions and acknowledgement of understanding      Vitals:  Vitals Value Taken Time   BP     Temp     Pulse     Resp     SpO2         Electronically Signed By: TYLER Herron CRNA  March 25, 2024  9:00 AM  
No

## 2024-10-06 ENCOUNTER — EMERGENCY (EMERGENCY)
Facility: HOSPITAL | Age: 89
LOS: 1 days | Discharge: HOME | End: 2024-10-06
Attending: EMERGENCY MEDICINE
Payer: MEDICARE

## 2024-10-06 VITALS
SYSTOLIC BLOOD PRESSURE: 147 MMHG | RESPIRATION RATE: 16 BRPM | DIASTOLIC BLOOD PRESSURE: 77 MMHG | OXYGEN SATURATION: 95 % | HEART RATE: 60 BPM

## 2024-10-06 VITALS
TEMPERATURE: 98 F | SYSTOLIC BLOOD PRESSURE: 139 MMHG | DIASTOLIC BLOOD PRESSURE: 65 MMHG | RESPIRATION RATE: 16 BRPM | HEIGHT: 62 IN | OXYGEN SATURATION: 96 % | WEIGHT: 130.07 LBS | HEART RATE: 63 BPM

## 2024-10-06 DIAGNOSIS — Z95.5 PRESENCE OF CORONARY ANGIOPLASTY IMPLANT AND GRAFT: Chronic | ICD-10-CM

## 2024-10-06 DIAGNOSIS — Z96.643 PRESENCE OF ARTIFICIAL HIP JOINT, BILATERAL: Chronic | ICD-10-CM

## 2024-10-06 PROCEDURE — 73552 X-RAY EXAM OF FEMUR 2/>: CPT | Mod: 26,LT

## 2024-10-06 PROCEDURE — 73110 X-RAY EXAM OF WRIST: CPT | Mod: 26,RT

## 2024-10-06 PROCEDURE — 73090 X-RAY EXAM OF FOREARM: CPT | Mod: 26,LT

## 2024-10-06 PROCEDURE — 73502 X-RAY EXAM HIP UNI 2-3 VIEWS: CPT | Mod: 26,LT

## 2024-10-06 PROCEDURE — 72125 CT NECK SPINE W/O DYE: CPT | Mod: 26,MC

## 2024-10-06 PROCEDURE — 72170 X-RAY EXAM OF PELVIS: CPT | Mod: 26,XE

## 2024-10-06 PROCEDURE — 70450 CT HEAD/BRAIN W/O DYE: CPT | Mod: 26,MC

## 2024-10-06 PROCEDURE — 99284 EMERGENCY DEPT VISIT MOD MDM: CPT | Mod: FS

## 2024-10-06 PROCEDURE — 73562 X-RAY EXAM OF KNEE 3: CPT | Mod: 26,LT

## 2024-10-06 RX ORDER — ACETAMINOPHEN 325 MG
650 TABLET ORAL ONCE
Refills: 0 | Status: COMPLETED | OUTPATIENT
Start: 2024-10-06 | End: 2024-10-06

## 2024-10-06 RX ADMIN — Medication 650 MILLIGRAM(S): at 11:33

## 2024-10-06 NOTE — ED PROVIDER NOTE - PATIENT PORTAL LINK FT
You can access the FollowMyHealth Patient Portal offered by [facility] by registering at the following website: [email or web address]. By joining [employer]’s FollowMyHealth portal, you will also be able to view your health information using other applications (apps) compatible with our system.

## 2024-10-06 NOTE — ED PROVIDER NOTE - OBJECTIVE STATEMENT
89 y/o female, takes daily 81mg aspirin, hip replacement, presents to the ER for s/p fall. patient states she tripped and fell in her kitchen over her slipper. states she did hit her head. states also hurt her left knee and right arm. states she is able to ambulate. denies LOC. denies f/n/v/d, CP, SOB, HA, dizziness, urinary symptoms.

## 2024-10-06 NOTE — ED ADULT NURSE NOTE - NSFALLUNIVINTERV_ED_ALL_ED
Bed/Stretcher in lowest position, wheels locked, appropriate side rails in place/Call bell, personal items and telephone in reach/Instruct patient to call for assistance before getting out of bed/chair/stretcher/Non-slip footwear applied when patient is off stretcher/[place] to call system/Physically safe environment - no spills, clutter or unnecessary equipment/Purposeful proactive rounding/Room/bathroom lighting operational, light cord in reach

## 2024-10-06 NOTE — ED PROVIDER NOTE - NSICDXPASTSURGICALHX_GEN_ALL_CORE_FT
PAST SURGICAL HISTORY:  History of bilateral hip replacements     Presence of stent in coronary artery

## 2024-10-06 NOTE — ED PROVIDER NOTE - PHYSICAL EXAMINATION
MSK: minimal ecchymosis noted to left knee. normal ROM of all extremities.   no spinal or paraspinal tenderness. MSK: minimal ecchymosis noted to left knee. normal ROM of all extremities.   no spinal or paraspinal tenderness.  Attending Jolanta Valladares: Gen: NAD, heent: atrauamtic, eomi, perrla, mmm, op pink, uvula midline, neck; nttp,, chest: nttp, no crepitus, cv: rrr, , lungs: ctab, abd: soft, nontender, nondistended, no peritoneal signs, +no guarding, ext: wwpabrasion to left knee, ttp right forearm, no ttp anatomic snuffbox, neuro: awake and alert, following commands, speech clear, sensation and strength intact, no focal deficits

## 2024-10-06 NOTE — ED PROVIDER NOTE - NSICDXPASTMEDICALHX_GEN_ALL_CORE_FT
PAST MEDICAL HISTORY:  CAD (coronary artery disease)     Glaucoma     Hypertension     Osteoarthritis

## 2024-10-06 NOTE — ED PROVIDER NOTE - PROGRESS NOTE DETAILS
Attending Jolanta Valladares: xrays negative for fracture will do trial of ambulation Paola Farah PA-C: results reviewed and discussed with both patient and daughter. patient advised to follow up with her pcp. advised to take Tylenol as needed. patient able to ambulate with minimal to no assistance. requesting to go home. well appearing. daughter present to take patient home. stable for discharge. discussed with Dr. Valladares.

## 2024-10-06 NOTE — ED ADULT NURSE NOTE - OBJECTIVE STATEMENT
Mechanical fall. Pt alert and orientedX4. Gait steady. Pt is ambulatory. Pt is in no distress. Breathing easy and non labored. Pt accompanied home by daughter and son in law.

## 2024-10-06 NOTE — ED PROVIDER NOTE - CLINICAL SUMMARY MEDICAL DECISION MAKING FREE TEXT BOX
Attending Jolanta Valladares: 89 yo female on aspirin presenting after mechanical fall. upn arrival primary survey intact and gcs 15. pt complanin gof pain to right forearm and left leg. able to range arm and no snuff box ttp. xray performed to evaluate for any evidence of fracture. no midline spinal ttp and nonfocal neurologic exam. pt did endorse hitting her head. ct head performed to evaluate for intracranial injury which was negative. no facial bone ttp. pt also reporting pain to left hip.no shortening and neurovascular intact. xrays performed showing no evidence of fracture. pt ambulated in the dept. d/w pt close return precuations and pain control.

## 2024-11-20 PROCEDURE — 73552 X-RAY EXAM OF FEMUR 2/>: CPT

## 2024-11-20 PROCEDURE — 73110 X-RAY EXAM OF WRIST: CPT

## 2024-11-20 PROCEDURE — 70450 CT HEAD/BRAIN W/O DYE: CPT | Mod: MC

## 2024-11-20 PROCEDURE — 72125 CT NECK SPINE W/O DYE: CPT | Mod: MC

## 2024-11-20 PROCEDURE — 73502 X-RAY EXAM HIP UNI 2-3 VIEWS: CPT

## 2024-11-20 PROCEDURE — 73562 X-RAY EXAM OF KNEE 3: CPT

## 2024-11-20 PROCEDURE — 99284 EMERGENCY DEPT VISIT MOD MDM: CPT | Mod: 25

## 2024-11-20 PROCEDURE — 72170 X-RAY EXAM OF PELVIS: CPT

## 2024-11-20 PROCEDURE — 73090 X-RAY EXAM OF FOREARM: CPT

## 2025-06-26 ENCOUNTER — EMERGENCY (EMERGENCY)
Facility: HOSPITAL | Age: 89
LOS: 1 days | End: 2025-06-26
Attending: STUDENT IN AN ORGANIZED HEALTH CARE EDUCATION/TRAINING PROGRAM
Payer: MEDICARE

## 2025-06-26 VITALS
TEMPERATURE: 99 F | HEART RATE: 72 BPM | SYSTOLIC BLOOD PRESSURE: 111 MMHG | DIASTOLIC BLOOD PRESSURE: 69 MMHG | HEIGHT: 62 IN | WEIGHT: 130.07 LBS | OXYGEN SATURATION: 98 % | RESPIRATION RATE: 16 BRPM

## 2025-06-26 DIAGNOSIS — Z95.5 PRESENCE OF CORONARY ANGIOPLASTY IMPLANT AND GRAFT: Chronic | ICD-10-CM

## 2025-06-26 DIAGNOSIS — Z96.643 PRESENCE OF ARTIFICIAL HIP JOINT, BILATERAL: Chronic | ICD-10-CM

## 2025-06-26 LAB
ALBUMIN SERPL ELPH-MCNC: 3.6 G/DL — SIGNIFICANT CHANGE UP (ref 3.3–5)
ALP SERPL-CCNC: 281 U/L — HIGH (ref 40–120)
ALT FLD-CCNC: 72 U/L — HIGH (ref 10–45)
ANION GAP SERPL CALC-SCNC: 15 MMOL/L — SIGNIFICANT CHANGE UP (ref 5–17)
AST SERPL-CCNC: 81 U/L — HIGH (ref 10–40)
BASE EXCESS BLDV CALC-SCNC: -0.8 MMOL/L — SIGNIFICANT CHANGE UP (ref -2–3)
BASOPHILS # BLD AUTO: 0.04 K/UL — SIGNIFICANT CHANGE UP (ref 0–0.2)
BASOPHILS # BLD MANUAL: 0.06 K/UL — SIGNIFICANT CHANGE UP (ref 0–0.2)
BASOPHILS NFR BLD AUTO: 0.6 % — SIGNIFICANT CHANGE UP (ref 0–2)
BASOPHILS NFR BLD MANUAL: 0.9 % — SIGNIFICANT CHANGE UP (ref 0–2)
BILIRUB SERPL-MCNC: 0.5 MG/DL — SIGNIFICANT CHANGE UP (ref 0.2–1.2)
BUN SERPL-MCNC: 26 MG/DL — HIGH (ref 7–23)
CALCIUM SERPL-MCNC: 9.8 MG/DL — SIGNIFICANT CHANGE UP (ref 8.4–10.5)
CHLORIDE SERPL-SCNC: 106 MMOL/L — SIGNIFICANT CHANGE UP (ref 96–108)
CO2 BLDV-SCNC: 27 MMOL/L — HIGH (ref 22–26)
CO2 SERPL-SCNC: 20 MMOL/L — LOW (ref 22–31)
CREAT SERPL-MCNC: 1.03 MG/DL — SIGNIFICANT CHANGE UP (ref 0.5–1.3)
EGFR: 50 ML/MIN/1.73M2 — LOW
EGFR: 50 ML/MIN/1.73M2 — LOW
ELLIPTOCYTES BLD QL SMEAR: SLIGHT — SIGNIFICANT CHANGE UP
EOSINOPHIL # BLD AUTO: 0.05 K/UL — SIGNIFICANT CHANGE UP (ref 0–0.5)
EOSINOPHIL # BLD MANUAL: 0 K/UL — SIGNIFICANT CHANGE UP (ref 0–0.5)
EOSINOPHIL NFR BLD AUTO: 0.8 % — SIGNIFICANT CHANGE UP (ref 0–6)
EOSINOPHIL NFR BLD MANUAL: 0 % — SIGNIFICANT CHANGE UP (ref 0–6)
FLUAV AG NPH QL: SIGNIFICANT CHANGE UP
FLUBV AG NPH QL: SIGNIFICANT CHANGE UP
GAS PNL BLDV: SIGNIFICANT CHANGE UP
GLUCOSE SERPL-MCNC: 101 MG/DL — HIGH (ref 70–99)
HCO3 BLDV-SCNC: 25 MMOL/L — SIGNIFICANT CHANGE UP (ref 22–29)
HCT VFR BLD CALC: 36.7 % — SIGNIFICANT CHANGE UP (ref 34.5–45)
HGB BLD-MCNC: 11.5 G/DL — SIGNIFICANT CHANGE UP (ref 11.5–15.5)
IMM GRANULOCYTES # BLD AUTO: 0.09 K/UL — HIGH (ref 0–0.07)
IMM GRANULOCYTES NFR BLD AUTO: 1.4 % — HIGH (ref 0–0.9)
LYMPHOCYTES # BLD AUTO: 0.93 K/UL — LOW (ref 1–3.3)
LYMPHOCYTES # BLD MANUAL: 0.87 K/UL — LOW (ref 1–3.3)
LYMPHOCYTES NFR BLD AUTO: 14.8 % — SIGNIFICANT CHANGE UP (ref 13–44)
LYMPHOCYTES NFR BLD MANUAL: 13.8 % — SIGNIFICANT CHANGE UP (ref 13–44)
MANUAL METAMYELOCYTE #: 0.06 K/UL — HIGH (ref 0–0)
MANUAL NEUTROPHIL BANDS #: 0.06 K/UL — SIGNIFICANT CHANGE UP (ref 0–0.84)
MCHC RBC-ENTMCNC: 29.5 PG — SIGNIFICANT CHANGE UP (ref 27–34)
MCHC RBC-ENTMCNC: 31.3 G/DL — LOW (ref 32–36)
MCV RBC AUTO: 94.1 FL — SIGNIFICANT CHANGE UP (ref 80–100)
METAMYELOCYTES # FLD: 0.9 % — HIGH (ref 0–0)
METAMYELOCYTES NFR BLD: 0.9 % — HIGH (ref 0–0)
MONOCYTES # BLD AUTO: 1.1 K/UL — HIGH (ref 0–0.9)
MONOCYTES # BLD MANUAL: 0.54 K/UL — SIGNIFICANT CHANGE UP (ref 0–0.9)
MONOCYTES NFR BLD AUTO: 17.5 % — HIGH (ref 2–14)
MONOCYTES NFR BLD MANUAL: 8.6 % — SIGNIFICANT CHANGE UP (ref 2–14)
NEUTROPHILS # BLD AUTO: 4.06 K/UL — SIGNIFICANT CHANGE UP (ref 1.8–7.4)
NEUTROPHILS # BLD MANUAL: 4.7 K/UL — SIGNIFICANT CHANGE UP (ref 1.8–7.4)
NEUTROPHILS NFR BLD AUTO: 64.9 % — SIGNIFICANT CHANGE UP (ref 43–77)
NEUTROPHILS NFR BLD MANUAL: 74.9 % — SIGNIFICANT CHANGE UP (ref 43–77)
NEUTS BAND # BLD: 0.9 % — SIGNIFICANT CHANGE UP (ref 0–8)
NEUTS BAND NFR BLD: 0.9 % — SIGNIFICANT CHANGE UP (ref 0–8)
NRBC # BLD AUTO: 0 K/UL — SIGNIFICANT CHANGE UP (ref 0–0)
NRBC # FLD: 0 K/UL — SIGNIFICANT CHANGE UP (ref 0–0)
NRBC BLD AUTO-RTO: 0 /100 WBCS — SIGNIFICANT CHANGE UP (ref 0–0)
NT-PROBNP SERPL-SCNC: 2623 PG/ML — HIGH (ref 0–300)
PCO2 BLDV: 47 MMHG — HIGH (ref 39–42)
PH BLDV: 7.34 — SIGNIFICANT CHANGE UP (ref 7.32–7.43)
PLAT MORPH BLD: NORMAL — SIGNIFICANT CHANGE UP
PLATELET # BLD AUTO: 152 K/UL — SIGNIFICANT CHANGE UP (ref 150–400)
PMV BLD: 11.2 FL — SIGNIFICANT CHANGE UP (ref 7–13)
PO2 BLDV: 35 MMHG — SIGNIFICANT CHANGE UP (ref 25–45)
POIKILOCYTOSIS BLD QL AUTO: SLIGHT — SIGNIFICANT CHANGE UP
POLYCHROMASIA BLD QL SMEAR: SLIGHT — SIGNIFICANT CHANGE UP
POTASSIUM SERPL-MCNC: 4.4 MMOL/L — SIGNIFICANT CHANGE UP (ref 3.5–5.3)
POTASSIUM SERPL-SCNC: 4.4 MMOL/L — SIGNIFICANT CHANGE UP (ref 3.5–5.3)
PROT SERPL-MCNC: 6.6 G/DL — SIGNIFICANT CHANGE UP (ref 6–8.3)
RBC # BLD: 3.9 M/UL — SIGNIFICANT CHANGE UP (ref 3.8–5.2)
RBC # FLD: 12.4 % — SIGNIFICANT CHANGE UP (ref 10.3–14.5)
RBC BLD AUTO: ABNORMAL
RSV RNA NPH QL NAA+NON-PROBE: SIGNIFICANT CHANGE UP
SAO2 % BLDV: 57.6 % — LOW (ref 67–88)
SARS-COV-2 RNA SPEC QL NAA+PROBE: SIGNIFICANT CHANGE UP
SODIUM SERPL-SCNC: 141 MMOL/L — SIGNIFICANT CHANGE UP (ref 135–145)
SOURCE RESPIRATORY: SIGNIFICANT CHANGE UP
TROPONIN T, HIGH SENSITIVITY RESULT: 13 NG/L — SIGNIFICANT CHANGE UP (ref 0–51)
TROPONIN T, HIGH SENSITIVITY RESULT: 20 NG/L — SIGNIFICANT CHANGE UP (ref 0–51)
WBC # BLD: 6.27 K/UL — SIGNIFICANT CHANGE UP (ref 3.8–10.5)
WBC # FLD AUTO: 6.27 K/UL — SIGNIFICANT CHANGE UP (ref 3.8–10.5)

## 2025-06-26 PROCEDURE — 80053 COMPREHEN METABOLIC PANEL: CPT

## 2025-06-26 PROCEDURE — 84484 ASSAY OF TROPONIN QUANT: CPT

## 2025-06-26 PROCEDURE — 83880 ASSAY OF NATRIURETIC PEPTIDE: CPT

## 2025-06-26 PROCEDURE — 0241U: CPT

## 2025-06-26 PROCEDURE — 71045 X-RAY EXAM CHEST 1 VIEW: CPT | Mod: 26

## 2025-06-26 PROCEDURE — 99223 1ST HOSP IP/OBS HIGH 75: CPT | Mod: FS

## 2025-06-26 PROCEDURE — 85025 COMPLETE CBC W/AUTO DIFF WBC: CPT

## 2025-06-26 PROCEDURE — 71045 X-RAY EXAM CHEST 1 VIEW: CPT

## 2025-06-26 PROCEDURE — 93010 ELECTROCARDIOGRAM REPORT: CPT

## 2025-06-26 PROCEDURE — 82803 BLOOD GASES ANY COMBINATION: CPT

## 2025-06-26 RX ORDER — DORZOLAMIDE 20 MG/ML
1 SOLUTION/ DROPS OPHTHALMIC
Refills: 0 | Status: DISCONTINUED | OUTPATIENT
Start: 2025-06-26 | End: 2025-06-30

## 2025-06-26 RX ORDER — ATORVASTATIN CALCIUM 80 MG/1
10 TABLET, FILM COATED ORAL AT BEDTIME
Refills: 0 | Status: DISCONTINUED | OUTPATIENT
Start: 2025-06-26 | End: 2025-06-30

## 2025-06-26 RX ORDER — CARVEDILOL 3.12 MG/1
6.25 TABLET, FILM COATED ORAL EVERY 12 HOURS
Refills: 0 | Status: DISCONTINUED | OUTPATIENT
Start: 2025-06-26 | End: 2025-06-30

## 2025-06-26 RX ORDER — ASPIRIN 325 MG
162 TABLET ORAL DAILY
Refills: 0 | Status: DISCONTINUED | OUTPATIENT
Start: 2025-06-26 | End: 2025-06-30

## 2025-06-26 RX ADMIN — CARVEDILOL 6.25 MILLIGRAM(S): 3.12 TABLET, FILM COATED ORAL at 20:11

## 2025-06-26 RX ADMIN — DORZOLAMIDE 1 DROP(S): 20 SOLUTION/ DROPS OPHTHALMIC at 20:11

## 2025-06-26 RX ADMIN — ATORVASTATIN CALCIUM 10 MILLIGRAM(S): 80 TABLET, FILM COATED ORAL at 22:24

## 2025-06-26 NOTE — ED PROVIDER NOTE - CLINICAL SUMMARY MEDICAL DECISION MAKING FREE TEXT BOX
Attending Pamela Morocho MD: 89-year-old female with past medical history of CAD status post multiple stents and prior MI presents with fatigue.  She reports symptoms began yesterday with generalized fatigue and tiredness.  Denies any other additional symptoms of fever, chills, nausea, chest pain. She reports dyspnea with exertion. Denies leg swelling. Patient with new onset complaints of left lower rib pain as well but denies fall/trauma.    PE: well appearing, nontoxic, no respiratory distress.  Regular rate and rhythm, lungs clear to auscultation. No edema. Tenderness to left anterior lower rib. Neuro nonfocal.  Skin intact. Psych normal mood.    MDM: Differential diagnosis includes but is not limited to PNA, URI, UTI, dehydration, CYDNEY, contusion, ACS  Will assess with labs, UA, ekg, XR   EKG with nonspecific changes

## 2025-06-26 NOTE — ED PROVIDER NOTE - PROGRESS NOTE DETAILS
Attending Joe: I received sign out on this patient. I am aware of the previously determined ongoing plan of care and what, if any, tests/consults are pending from the previous provider. I am available for supervision of the ongoing plan of care for the Resident/IVAN/Fellow/Student.    pt to be evaluated by CDU for CHF work up. Paige Beltran MD (PGY2) Labs concerning for new onset CHF.  Patient amenable to CDU stay for echo.  Patient is hemodynamically stable and denies any chest pain or shortness of breath at this time.

## 2025-06-26 NOTE — ED CDU PROVIDER INITIAL DAY NOTE - CLINICAL SUMMARY MEDICAL DECISION MAKING FREE TEXT BOX
Attending Pamela Morocho MD: refer to ED provider note for full H&P   In short, 89 yo F with PMHx of CAD presents with fatigue   Labs significant for elevated BNP and patient with JORDAN   Transferred to CDU for TTE, cards eval and tele
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
## 2025-06-26 NOTE — ED ADULT NURSE REASSESSMENT NOTE - NS ED NURSE REASSESS COMMENT FT1
1700 Received the Pt from  GENE Mayes Pt is Observed for SOB for ECHO  Received the Pt A&OX 4  Pt obeys commands Kathie N/V/D fever chills cp SOB   Comfort care & safety measures continued  IV site looks clean & dry no signs of infiltration noted pt denies  pain IV site .Pt is oriented to the unit Plan of care explained .  Pt is advised to call for help  call bell with in the reach pt verbalized the understanding .  pending CDU  MD salazar . GCS 15/15 A&OX 4 PERRLA  size 3 Strong upper & lower extremities steady gait  Pt needs help with ambulation Fall precautions are in place  No facial droop  No Hand Leg drop denies numbness tingling  Plan of care ongoing

## 2025-06-26 NOTE — ED ADULT NURSE NOTE - OBJECTIVE STATEMENT
89 year old female, A&Ox4, PMH CAD s/p multiple stents, previous MI, presenting to ED complaining of fatigue. Patient states symptoms started on Tuesday, endorsing generalized weakness, fatigue and SOB on exertion. Denies CP, HA, n/v/d, abdominal pain, difficulty urinating, fevers and chills. Patient placed on cardiac monitor, NSR 60s. Respirations clear and equal bilaterally. Abdomen soft, nontender and nondistended. Peripheral pulses strong and equal bilaterally. IV placed and labs drawn. Safety and comfort measures maintained.

## 2025-06-26 NOTE — ED PROVIDER NOTE - IV ALTEPLASE EXCL ABS HIDDEN
SUBJECTIVE:  Tonya Daniel is a 45 y.o. female who presents for her routine physical exam.  However, it was clear that her Type 2 diabetes was really poorly controlled today, so therefore, the physical exam was delayed in favor of treating her Type 2 DM.      1) Type 2 DM:  Did intermittent fasting - not eating for 16 hours.  Blood sugars were good in the 130s off of metformin.  So she stopped the metformin.  Was 145 lbs.  Would drink water with lemon and david during the day.  She notes that she stopped the intermittent fasting, put all the weight back on, but did not restart the metformin.  She has been really tired lately.  Informed her that her Hgb A1C today = 11.3.  Discussed what this could mean for her diabetes.    Patient has had a difficult time managing her Type 2 DM.  She would only schedule a visit once a year for her visits in spite of being uncontrolled. Since 2016.  Discussed my concern that her A1C increased from 6.5 to 9.4 from  to 2016.  Discussed again today the reasons for treating diabetes and the pathophysiology of the disease process.  The longer it goes untreated, the more likely that her beta cells could burn out and then she may require insulin.  Discussed that the whole point of medication management is to delay the onset of having to use insulin.  Patient seems to believe that if she can lose enough weight, she would not have Type 2 DM.  Discussed her BMI is only 28.  Both her parents are not obese.  Her father  at a young age due to renal failure and was on dialysis due to diabetes.  While weight loss is certainly helpful, her blood sugars need to be controlled in the interim and would also provide her with more energy and less fatigue.  Discussed how chronically high blood sugars can cause fatigue.    2) Hyperlipidemia:  Discussed the new ACC/AHA guidelines for statin treatment in the setting of Type 2 Diabetes.  Recommend changing to atorvastatin.  Currently she is  "not having problems with her medication.    3) HTN: Not well controlled.  Reviewed the goals of diabetic care in order to reduce the risk of heart disease.  She notes no headaches or chest pain.    Patient Active Problem List    Diagnosis Date Noted     Need for hepatitis vaccination 02/15/2015     Hepatitis vaccination status unknown 02/11/2015     Pure hypercholesterolemia 12/08/2014     Infected Nonvenomous Insect Bite      Lymphadenopathy      Benign Essential Hypertension      Acute Upper Respiratory Infection      Urinary Incontinence      Type 2 diabetes mellitus (H)        Current Outpatient Prescriptions:      levonorgestrel (MIRENA) 20 mcg/24 hr (5 years) IUD, 1 each by Intrauterine route once., Disp: , Rfl:      ONETOUCH ULTRA TEST strips, USE TO TEST ONCE PER DAY AS NEEDED, Disp: 100 strip, Rfl: 0     simvastatin 20 MG tablet, Take 1 tablet (20 mg total) by mouth at bedtime., Disp: 90 tablet, Rfl: 0     lisinopril (PRINIVIL,ZESTRIL) 10 MG tablet, Take 1 tablet (20 mg total) by mouth daily., Disp: 90 tablet, Rfl: 0     metFORMIN (GLUCOPHAGE) 1000 MG tablet, Take 1 tablet (1,000 mg total) by mouth 2 (two) times a day with meals., Disp: 180 tablet, Rfl: 0    Current Facility-Administered Medications:      ibuprofen tablet 600 mg (ADVIL,MOTRIN), 600 mg, Oral, Once, Vy Espinoza MD    Objective     BP (!) 150/100  Pulse 73  Ht 5' 3.25\" (1.607 m)  Wt 163 lb (73.9 kg)  SpO2 98%  Breastfeeding? No  BMI 28.65 kg/m2  General appearance: alert, appears stated age and cooperative  Lungs: clear to auscultation bilaterally  Heart: regular rate and rhythm, S1, S2 normal, no murmur, click, rub or gallop  Extremities: extremities normal, atraumatic, no cyanosis or edema     Recent Results (from the past 240 hour(s))   Glycosylated Hemoglobin A1c   Result Value Ref Range    Hemoglobin A1c 11.3 (H) 3.5 - 6.0 %   Microalbumin, Random Urine   Result Value Ref Range    Microalbumin, Random Urine 2.19 (H) 0.00 - 1.99 " mg/dL    Creatinine, Urine 56.1 mg/dL    Microalbumin/Creatinine Ratio Random Urine 39.0 (H) <=19.9 mg/g   Comprehensive Metabolic Panel   Result Value Ref Range    Sodium 138 136 - 145 mmol/L    Potassium 4.3 3.5 - 5.0 mmol/L    Chloride 103 98 - 107 mmol/L    CO2 24 22 - 31 mmol/L    Anion Gap, Calculation 11 5 - 18 mmol/L    Glucose 317 (H) 70 - 125 mg/dL    BUN 11 8 - 22 mg/dL    Creatinine 0.76 0.60 - 1.10 mg/dL    GFR MDRD Af Amer >60 >60 mL/min/1.73m2    GFR MDRD Non Af Amer >60 >60 mL/min/1.73m2    Bilirubin, Total 0.4 0.0 - 1.0 mg/dL    Calcium 9.4 8.5 - 10.5 mg/dL    Protein, Total 6.9 6.0 - 8.0 g/dL    Albumin 3.9 3.5 - 5.0 g/dL    Alkaline Phosphatase 78 45 - 120 U/L    AST 20 0 - 40 U/L    ALT 34 0 - 45 U/L   Lipid Cascade   Result Value Ref Range    Cholesterol 160 <=199 mg/dL    Triglycerides 234 (H) <=149 mg/dL    HDL Cholesterol 44 (L) >=50 mg/dL    LDL Calculated 69 <=129 mg/dL    Patient Fasting > 8hrs? Unknown    HM1 (CBC with Diff)   Result Value Ref Range    WBC 5.7 4.0 - 11.0 thou/uL    RBC 4.98 3.80 - 5.40 mill/uL    Hemoglobin 15.1 12.0 - 16.0 g/dL    Hematocrit 42.3 35.0 - 47.0 %    MCV 85 80 - 100 fL    MCH 30.3 27.0 - 34.0 pg    MCHC 35.7 32.0 - 36.0 g/dL    RDW 11.7 11.0 - 14.5 %    Platelets 244 140 - 440 thou/uL    MPV 8.2 7.0 - 10.0 fL    Neutrophils % 56 50 - 70 %    Lymphocytes % 36 20 - 40 %    Monocytes % 7 2 - 10 %    Eosinophils % 1 0 - 6 %    Basophils % 0 0 - 2 %    Neutrophils Absolute 3.2 2.0 - 7.7 thou/uL    Lymphocytes Absolute 2.1 0.8 - 4.4 thou/uL    Monocytes Absolute 0.4 0.0 - 0.9 thou/uL    Eosinophils Absolute 0.1 0.0 - 0.4 thou/uL    Basophils Absolute 0.0 0.0 - 0.2 thou/uL       Castaneda was seen today for annual exam, diabetes and fatigue.    Diagnoses and all orders for this visit:    Type 2 diabetes mellitus (H)  -     Glycosylated Hemoglobin A1c  -     [facility] RED  -     metFORMIN (GLUCOPHAGE) 1000 MG tablet; Take 1 tablet (1,000 mg total) by mouth 2 (two) times  a day with meals.  -     Amb Referral to Medication Management  -     Comprehensive Metabolic Panel  -     Microalbumin, Random Urine  Very poorly controlled.  Will restart metformin 1000 mg two times a day.   Will refer to MT to start trulicity to help her on her weight loss goals and also help with blood sugar control.  Later, patient called back, cancelling her appointment with MTM.  Recommended strongly that she RTC in 3 months for a recheck.  Previously, she would only schedule once a year and discussed that this is not enough to control her diabetes and her diabetes has been getting worse.    Visit for screening mammogram  -     Mammo Screening Bilateral; Future    Familial hypercholesterolemia  -     atorvastatin (LIPITOR) 20 MG tablet; Take 1 tablet (20 mg total) by mouth at bedtime.  -     Comprehensive Metabolic Panel  -     Lipid Cascade  Will stop simvastatin and start atorvastatin instead according to AHA/ACC guidelines.    Benign Essential Hypertension  -     lisinopril (PRINIVIL,ZESTRIL) 20 MG tablet; Take 1 tablet (20 mg total) by mouth daily.  -     HM1(CBC and Differential)  -     HM1 (CBC with Diff)  Not well controlled.  Will increase lisinopril to 20 mg daily.  Recommend recheck in 6 weeks.           show

## 2025-06-27 ENCOUNTER — RESULT REVIEW (OUTPATIENT)
Age: 89
End: 2025-06-27

## 2025-06-27 VITALS — WEIGHT: 128.31 LBS

## 2025-06-27 LAB
A1C WITH ESTIMATED AVERAGE GLUCOSE RESULT: 5.7 % — HIGH (ref 4–5.6)
ALBUMIN SERPL ELPH-MCNC: 2.5 G/DL — LOW (ref 3.3–5)
ALP SERPL-CCNC: 221 U/L — HIGH (ref 40–120)
ALT FLD-CCNC: 56 U/L — HIGH (ref 10–45)
ANION GAP SERPL CALC-SCNC: 10 MMOL/L — SIGNIFICANT CHANGE UP (ref 5–17)
AST SERPL-CCNC: 65 U/L — HIGH (ref 10–40)
BILIRUB SERPL-MCNC: 0.4 MG/DL — SIGNIFICANT CHANGE UP (ref 0.2–1.2)
BUN SERPL-MCNC: 18 MG/DL — SIGNIFICANT CHANGE UP (ref 7–23)
CALCIUM SERPL-MCNC: 7.8 MG/DL — LOW (ref 8.4–10.5)
CHLORIDE SERPL-SCNC: 113 MMOL/L — HIGH (ref 96–108)
CHOLEST SERPL-MCNC: 65 MG/DL — SIGNIFICANT CHANGE UP
CO2 SERPL-SCNC: 17 MMOL/L — LOW (ref 22–31)
CREAT SERPL-MCNC: 0.68 MG/DL — SIGNIFICANT CHANGE UP (ref 0.5–1.3)
EGFR: 81 ML/MIN/1.73M2 — SIGNIFICANT CHANGE UP
EGFR: 81 ML/MIN/1.73M2 — SIGNIFICANT CHANGE UP
ESTIMATED AVERAGE GLUCOSE: 117 MG/DL — HIGH (ref 68–114)
GLUCOSE SERPL-MCNC: 82 MG/DL — SIGNIFICANT CHANGE UP (ref 70–99)
HDLC SERPL-MCNC: 24 MG/DL — LOW
LDLC SERPL-MCNC: 28 MG/DL — SIGNIFICANT CHANGE UP
LIPID PNL WITH DIRECT LDL SERPL: 28 MG/DL — SIGNIFICANT CHANGE UP
NONHDLC SERPL-MCNC: 41 MG/DL — SIGNIFICANT CHANGE UP
POTASSIUM SERPL-MCNC: 3.7 MMOL/L — SIGNIFICANT CHANGE UP (ref 3.5–5.3)
POTASSIUM SERPL-SCNC: 3.7 MMOL/L — SIGNIFICANT CHANGE UP (ref 3.5–5.3)
PROT SERPL-MCNC: 4.9 G/DL — LOW (ref 6–8.3)
SODIUM SERPL-SCNC: 140 MMOL/L — SIGNIFICANT CHANGE UP (ref 135–145)
TRIGL SERPL-MCNC: 51 MG/DL — SIGNIFICANT CHANGE UP

## 2025-06-27 PROCEDURE — 71045 X-RAY EXAM CHEST 1 VIEW: CPT

## 2025-06-27 PROCEDURE — 83036 HEMOGLOBIN GLYCOSYLATED A1C: CPT

## 2025-06-27 PROCEDURE — 99239 HOSP IP/OBS DSCHRG MGMT >30: CPT

## 2025-06-27 PROCEDURE — 80061 LIPID PANEL: CPT

## 2025-06-27 PROCEDURE — 87637 SARSCOV2&INF A&B&RSV AMP PRB: CPT

## 2025-06-27 PROCEDURE — G0378: CPT

## 2025-06-27 PROCEDURE — 85025 COMPLETE CBC W/AUTO DIFF WBC: CPT

## 2025-06-27 PROCEDURE — 93005 ELECTROCARDIOGRAM TRACING: CPT

## 2025-06-27 PROCEDURE — 93306 TTE W/DOPPLER COMPLETE: CPT | Mod: 26

## 2025-06-27 PROCEDURE — 99285 EMERGENCY DEPT VISIT HI MDM: CPT | Mod: 25

## 2025-06-27 PROCEDURE — 0241U: CPT

## 2025-06-27 PROCEDURE — 80053 COMPREHEN METABOLIC PANEL: CPT

## 2025-06-27 PROCEDURE — 84484 ASSAY OF TROPONIN QUANT: CPT

## 2025-06-27 PROCEDURE — C8929: CPT

## 2025-06-27 PROCEDURE — 82803 BLOOD GASES ANY COMBINATION: CPT

## 2025-06-27 PROCEDURE — 83880 ASSAY OF NATRIURETIC PEPTIDE: CPT

## 2025-06-27 PROCEDURE — 36000 PLACE NEEDLE IN VEIN: CPT | Mod: XU

## 2025-06-27 RX ADMIN — Medication 162 MILLIGRAM(S): at 08:56

## 2025-06-27 RX ADMIN — DORZOLAMIDE 1 DROP(S): 20 SOLUTION/ DROPS OPHTHALMIC at 08:46

## 2025-06-27 RX ADMIN — CARVEDILOL 6.25 MILLIGRAM(S): 3.12 TABLET, FILM COATED ORAL at 08:46

## 2025-06-27 NOTE — CONSULT NOTE ADULT - SUBJECTIVE AND OBJECTIVE BOX
MR:- [id number] :  NAME:ERA JOINERETTE:-    DATE OF SERVICE:06-27-25 @ 05:57    Patient was seen,examined and evaluated  by Sujit Whitney MD [id number] @ 05:57 .  ER evaluation, Labs and Hospital course was reviewed,    CHIEF COMPLAINT:    HPI:89-year-old female with past medical history of CAD status post multiple stents and prior MI presents with fatigueDenies any other additional symptoms of fever, chills, nausea, chest pain. She reports dyspnea with exertion. Denies leg swelling. Patient with new onset complaints of left lower rib pain as well but denies fall/trauma.      CARDIAC HISTORY:  [ x] CAD [x [PCI [ ] CABG [ ] Prior Cath  [ ] Atrial Fibrillation  Devices[ ] PPM [ ] ICD [ ]ILR  Heart Failure [ ] HFrEF [ ] HFpEF    PAST MEDICAL & SURGICAL HISTORY:  CAD (coronary artery disease)      Hypertension      Glaucoma      Osteoarthritis      Presence of stent in coronary artery      History of bilateral hip replacements    Home Medications:   * Patient Currently Takes Medications as of 28-Oct-2023 10:39 documented in Structured Notes  · 	diclofenac 1% topical gel: Apply topically to affected area 2 times a day  · 	acetaminophen 325 mg oral tablet: 3 tab(s) orally every 8 hours  · 	aspirin 81 mg oral tablet: 2 tab(s) orally once a day  · 	dorzolamide 2% ophthalmic solution: 1 drop(s) in the left eye 2 times a day  · 	carvedilol 6.25 mg oral tablet: 1 tab(s) orally 2 times a day  · 	latanoprost 0.005% ophthalmic solution: 1 drop(s) in the left eye once a day  · 	atorvastatin 10 mg oral tablet: 1 tab(s) orally once a day  · 	Centrum Silver Women's Multivitamins: 1 tablet orally once a day      MEDICATIONS  (STANDING):  aspirin  chewable 162 milliGRAM(s) Oral daily  atorvastatin 10 milliGRAM(s) Oral at bedtime  carvedilol 6.25 milliGRAM(s) Oral every 12 hours  dorzolamide 2% Ophthalmic Solution 1 Drop(s) Left EYE <User Schedule>    MEDICATIONS  (PRN):      FAMILY HISTORY:  No pertinent family history in first degree relatives      No family history of premature coronary artery disease or sudden cardiac death    SOCIAL HISTORY:  Smoking-[ ] Active  [ ] Former [x ] Non Smoker  Alcohol-[x ] Denies [ ] Social [ ] Daily  Ilicit Drug use-[x ] Denies [ ] Active user    REVIEW OF SYSTEMS:  Constitutional: [ ] fever, [ ]weight loss, [x ]fatigue   Activity [ ] Bedbound,[x ] Ambulates [x ] Unassisted[ ] Cane/Walker [ ] Assistence.  Effort tolerance:[ ] Excellent [ ] Good [x ] Fair [ ] Poor [ ]  Eyes: [ ] visual changes  Respiratory: [x ]shortness of breath;  [ ] cough, [ ]wheezing, [ ]chills, [ ]hemoptysis  Cardiovascular: [x ] chest pain, [ ]palpitations, [ ]dizziness,  [ ]leg swelling[ ]orthopnea [ ]PND  Gastrointestinal: [ ] abdominal pain, [ ]nausea, [ ]vomiting,  [ ]diarrhea,[ ]constipation  Genitourinary: [ ] dysuria, [ ] hematuria  Neurologic: [ ] headaches [ ] tremors[ ] weakness  Skin: [ ] itching, [ ]burning, [ ] rashes  Endocrine: [ ] heat or cold intolerance  Musculoskeletal: [ ] joint pain or swelling; [ ] muscle, back, or extremity pain  Psychiatric: [ ] depression, [ ]anxiety, [ ]mood swings, or [ ]difficulty sleeping  Hematologic: [ ] easy bruising, [ ] bleeding gums       [ x] All others negative	  [ ] Unable to obtain    Vital Signs Last 24 Hrs  T(C): 37.2 (27 Jun 2025 04:51), Max: 37.3 (27 Jun 2025 00:35)  T(F): 99 (27 Jun 2025 04:51), Max: 99.2 (27 Jun 2025 00:35)  HR: 67 (27 Jun 2025 04:51) (65 - 78)  BP: 155/84 (27 Jun 2025 04:51) (111/69 - 156/82)  RR: 18 (27 Jun 2025 04:51) (16 - 20)  SpO2: 95% (27 Jun 2025 04:51) (95% - 100%)    Parameters below as of 27 Jun 2025 04:51  Patient On (Oxygen Delivery Method): room air      I&O's Summary      PHYSICAL EXAM:  General: No acute distress BMI-23.8  HEENT: EOMI, PERRL[ ] Icteric  Neck: Supple, [ ] JVD  Lungs: Equal air entry bilaterally; [ ] Rales [ ] Rhonchi [ ] Wheezing  Heart: Regular rate and rhythm;[x ] Murmurs-  3 /6 [ x] Systolic [ ] Diastolic [ ] Radiation,No rubs, or gallops  Abdomen: Nontender, bowel sounds present  Extremities: No clubbing, cyanosis, [ ] edema[ ] Calf tenderness  Nervous system:  Alert & Oriented X3, no focal deficits  Psychiatric: Normal affect  Skin: No rashes or lesions      LABS:  06-26    141  |  106  |  26[H]  ----------------------------<  101[H]  4.4   |  20[L]  |  1.03    Ca    9.8      26 Jun 2025 12:57    TPro  6.6  /  Alb  3.6  /  TBili  0.5  /  DBili  x   /  AST  81[H]  /  ALT  72[H]  /  AlkPhos  281[H]  06-26    Creatinine Trend: 1.03<--                        11.5   6.27  )-----------( 152      ( 26 Jun 2025 12:57 )             36.7       Troponin T, High Sensitivity Result: 13 <--20 ng/L    Pro-Brain Natriuretic Peptide: 2623 pg/mL    Xray Chest 1 View- PORTABLE-Urgent (06.26.25 @ 14:50) >  FINDINGS:    The heart is not accurately assessed in this AP projection.  The lungs are clear.  There is no pneumothorax or pleural effusion.    IMPRESSION:  Clear lungs.       MR:- [id number] :  NAME:ERA JOINERETTE:-    DATE OF SERVICE:06-27-25 @ 05:57    Patient was seen,examined and evaluated  by Sujit Whitney MD [id number] @ 05:57 .  ER evaluation, Labs and Hospital course was reviewed,    CHIEF COMPLAINT:    HPI:89-year-old female with past medical history of CAD status post multiple stents and prior MI presents with fatigueDenies any other additional symptoms of fever, chills, nausea, chest pain. She reports dyspnea with exertion. Denies leg swelling. Patient with new onset complaints of left lower rib pain as well but denies fall/trauma.    Awake alert no chest pain at present-no orthopnea       CARDIAC HISTORY:  [ x] CAD [x [PCI [ ] CABG [ ] Prior Cath  [ ] Atrial Fibrillation  Devices[ ] PPM [ ] ICD [ ]ILR  Heart Failure [ ] HFrEF [ ] HFpEF    PAST MEDICAL & SURGICAL HISTORY:  CAD (coronary artery disease)      Hypertension      Glaucoma      Osteoarthritis      Presence of stent in coronary artery      History of bilateral hip replacements    Home Medications:   * Patient Currently Takes Medications as of 28-Oct-2023 10:39 documented in Structured Notes  · 	diclofenac 1% topical gel: Apply topically to affected area 2 times a day  · 	acetaminophen 325 mg oral tablet: 3 tab(s) orally every 8 hours  · 	aspirin 81 mg oral tablet: 2 tab(s) orally once a day  · 	dorzolamide 2% ophthalmic solution: 1 drop(s) in the left eye 2 times a day  · 	carvedilol 6.25 mg oral tablet: 1 tab(s) orally 2 times a day  · 	latanoprost 0.005% ophthalmic solution: 1 drop(s) in the left eye once a day  · 	atorvastatin 10 mg oral tablet: 1 tab(s) orally once a day  · 	Centrum Silver Women's Multivitamins: 1 tablet orally once a day      MEDICATIONS  (STANDING):  aspirin  chewable 162 milliGRAM(s) Oral daily  atorvastatin 10 milliGRAM(s) Oral at bedtime  carvedilol 6.25 milliGRAM(s) Oral every 12 hours  dorzolamide 2% Ophthalmic Solution 1 Drop(s) Left EYE <User Schedule>    MEDICATIONS  (PRN):      FAMILY HISTORY:  No pertinent family history in first degree relatives      No family history of premature coronary artery disease or sudden cardiac death    SOCIAL HISTORY:  Smoking-[ ] Active  [ ] Former [x ] Non Smoker  Alcohol-[x ] Denies [ ] Social [ ] Daily  Ilicit Drug use-[x ] Denies [ ] Active user    REVIEW OF SYSTEMS:  Constitutional: [ ] fever, [ ]weight loss, [x ]fatigue   Activity [ ] Bedbound,[x ] Ambulates [x ] Unassisted[ ] Cane/Walker [ ] Assistence.  Effort tolerance:[ ] Excellent [ ] Good [x ] Fair [ ] Poor [ ]  Eyes: [ ] visual changes  Respiratory: [x ]shortness of breath;  [ ] cough, [ ]wheezing, [ ]chills, [ ]hemoptysis  Cardiovascular: [x ] chest pain, [ ]palpitations, [ ]dizziness,  [ ]leg swelling[ ]orthopnea [ ]PND  Gastrointestinal: [ ] abdominal pain, [ ]nausea, [ ]vomiting,  [ ]diarrhea,[ ]constipation  Genitourinary: [ ] dysuria, [ ] hematuria  Neurologic: [ ] headaches [ ] tremors[ ] weakness  Skin: [ ] itching, [ ]burning, [ ] rashes  Endocrine: [ ] heat or cold intolerance  Musculoskeletal: [ ] joint pain or swelling; [ ] muscle, back, or extremity pain  Psychiatric: [ ] depression, [ ]anxiety, [ ]mood swings, or [ ]difficulty sleeping  Hematologic: [ ] easy bruising, [ ] bleeding gums       [ x] All others negative	  [ ] Unable to obtain    Vital Signs Last 24 Hrs  T(C): 37.2 (27 Jun 2025 04:51), Max: 37.3 (27 Jun 2025 00:35)  T(F): 99 (27 Jun 2025 04:51), Max: 99.2 (27 Jun 2025 00:35)  HR: 67 (27 Jun 2025 04:51) (65 - 78)  BP: 155/84 (27 Jun 2025 04:51) (111/69 - 156/82)  RR: 18 (27 Jun 2025 04:51) (16 - 20)  SpO2: 95% (27 Jun 2025 04:51) (95% - 100%)    Parameters below as of 27 Jun 2025 04:51  Patient On (Oxygen Delivery Method): room air      I&O's Summary      PHYSICAL EXAM:  General: No acute distress BMI-23.8  HEENT: EOMI, PERRL[ ] Icteric  Neck: Supple, [ ] JVD  Lungs: Equal air entry bilaterally; [ ] Rales [ ] Rhonchi [ ] Wheezing  Heart: Regular rate and rhythm;[x ] Murmurs-  3 /6 [ x] Systolic [ ] Diastolic [ ] Radiation,No rubs, or gallops  Abdomen: Nontender, bowel sounds present  Extremities: No clubbing, cyanosis, [ ] edema[ ] Calf tenderness  Nervous system:  Alert & Oriented X3, no focal deficits  Psychiatric: Normal affect  Skin: No rashes or lesions      LABS:  06-26    141  |  106  |  26[H]  ----------------------------<  101[H]  4.4   |  20[L]  |  1.03    Ca    9.8      26 Jun 2025 12:57    TPro  6.6  /  Alb  3.6  /  TBili  0.5  /  DBili  x   /  AST  81[H]  /  ALT  72[H]  /  AlkPhos  281[H]  06-26    Creatinine Trend: 1.03<--                        11.5   6.27  )-----------( 152      ( 26 Jun 2025 12:57 )             36.7       Troponin T, High Sensitivity Result: 13 <--20 ng/L    Pro-Brain Natriuretic Peptide: 2623 pg/mL    Xray Chest 1 View- PORTABLE-Urgent (06.26.25 @ 14:50) >  FINDINGS:    The heart is not accurately assessed in this AP projection.  The lungs are clear.  There is no pneumothorax or pleural effusion.    IMPRESSION:  Clear lungs.      ECG:  Sinus Rhythm  Normal Intervals  No acute ST T wave abnormalities     MR:- [id number] :  NAME:EAR JOINERETTE:-    DATE OF SERVICE:06-27-25 @ 05:57    Patient was seen,examined and evaluated  by Sujit Whitney MD [id number] @ 05:57 .  ER evaluation, Labs and Hospital course was reviewed,    CHIEF COMPLAINT:    HPI:89-year-old female with past medical history of CAD status post multiple stents and prior MI presents with fatigueDenies any other additional symptoms of fever, chills, nausea, chest pain. She reports dyspnea with exertion. Denies leg swelling. Patient with new onset complaints of left lower rib pain as well but denies fall/trauma.    Awake alert no chest pain at present-no orthopnea       CARDIAC HISTORY:  [ x] CAD [x [PCI [ ] CABG [ ] Prior Cath  [ ] Atrial Fibrillation  Devices[ ] PPM [ ] ICD [ ]ILR  Heart Failure [ ] HFrEF [ ] HFpEF    PAST MEDICAL & SURGICAL HISTORY:  CAD (coronary artery disease)      Hypertension      Glaucoma      Osteoarthritis      Presence of stent in coronary artery      History of bilateral hip replacements    Home Medications:   * Patient Currently Takes Medications as of 28-Oct-2023 10:39 documented in Structured Notes  · 	diclofenac 1% topical gel: Apply topically to affected area 2 times a day  · 	acetaminophen 325 mg oral tablet: 3 tab(s) orally every 8 hours  · 	aspirin 81 mg oral tablet: 2 tab(s) orally once a day  · 	dorzolamide 2% ophthalmic solution: 1 drop(s) in the left eye 2 times a day  · 	carvedilol 6.25 mg oral tablet: 1 tab(s) orally 2 times a day  · 	latanoprost 0.005% ophthalmic solution: 1 drop(s) in the left eye once a day  · 	atorvastatin 10 mg oral tablet: 1 tab(s) orally once a day  · 	Centrum Silver Women's Multivitamins: 1 tablet orally once a day      MEDICATIONS  (STANDING):  aspirin  chewable 162 milliGRAM(s) Oral daily  atorvastatin 10 milliGRAM(s) Oral at bedtime  carvedilol 6.25 milliGRAM(s) Oral every 12 hours  dorzolamide 2% Ophthalmic Solution 1 Drop(s) Left EYE <User Schedule>    MEDICATIONS  (PRN):      FAMILY HISTORY:  No pertinent family history in first degree relatives      No family history of premature coronary artery disease or sudden cardiac death    SOCIAL HISTORY:  Smoking-[ ] Active  [ ] Former [x ] Non Smoker  Alcohol-[x ] Denies [ ] Social [ ] Daily  Ilicit Drug use-[x ] Denies [ ] Active user    REVIEW OF SYSTEMS:  Constitutional: [ ] fever, [ ]weight loss, [x ]fatigue   Activity [ ] Bedbound,[x ] Ambulates [x ] Unassisted[ ] Cane/Walker [ ] Assistence.  Effort tolerance:[ ] Excellent [ ] Good [x ] Fair [ ] Poor [ ]  Eyes: [ ] visual changes  Respiratory: [x ]shortness of breath;  [ ] cough, [ ]wheezing, [ ]chills, [ ]hemoptysis  Cardiovascular: [x ] chest pain, [ ]palpitations, [ ]dizziness,  [ ]leg swelling[ ]orthopnea [ ]PND  Gastrointestinal: [ ] abdominal pain, [ ]nausea, [ ]vomiting,  [ ]diarrhea,[ ]constipation  Genitourinary: [ ] dysuria, [ ] hematuria  Neurologic: [ ] headaches [ ] tremors[ ] weakness  Skin: [ ] itching, [ ]burning, [ ] rashes  Endocrine: [ ] heat or cold intolerance  Musculoskeletal: [ ] joint pain or swelling; [ ] muscle, back, or extremity pain  Psychiatric: [ ] depression, [ ]anxiety, [ ]mood swings, or [ ]difficulty sleeping  Hematologic: [ ] easy bruising, [ ] bleeding gums       [ x] All others negative	  [ ] Unable to obtain    Vital Signs Last 24 Hrs  T(C): 37.2 (27 Jun 2025 04:51), Max: 37.3 (27 Jun 2025 00:35)  T(F): 99 (27 Jun 2025 04:51), Max: 99.2 (27 Jun 2025 00:35)  HR: 67 (27 Jun 2025 04:51) (65 - 78)  BP: 155/84 (27 Jun 2025 04:51) (111/69 - 156/82)  RR: 18 (27 Jun 2025 04:51) (16 - 20)  SpO2: 95% (27 Jun 2025 04:51) (95% - 100%)    Parameters below as of 27 Jun 2025 04:51  Patient On (Oxygen Delivery Method): room air      I&O's Summary      PHYSICAL EXAM:  General: No acute distress BMI-23.8  HEENT: EOMI, PERRL[ ] Icteric  Neck: Supple, [ ] JVD  Lungs: Equal air entry bilaterally; [ ] Rales [ ] Rhonchi [ ] Wheezing  Heart: Regular rate and rhythm;[x ] Murmurs-  3 /6 [ x] Systolic [ ] Diastolic [ ] Radiation,No rubs, or gallops  Abdomen: Nontender, bowel sounds present  Extremities: No clubbing, cyanosis, [ ] edema[ ] Calf tenderness  Nervous system:  Alert & Oriented X3, no focal deficits  Psychiatric: Normal affect  Skin: No rashes or lesions      LABS:  06-26    141  |  106  |  26[H]  ----------------------------<  101[H]  4.4   |  20[L]  |  1.03    Ca    9.8      26 Jun 2025 12:57    TPro  6.6  /  Alb  3.6  /  TBili  0.5  /  DBili  x   /  AST  81[H]  /  ALT  72[H]  /  AlkPhos  281[H]  06-26    Creatinine Trend: 1.03<--                        11.5   6.27  )-----------( 152      ( 26 Jun 2025 12:57 )             36.7       Troponin T, High Sensitivity Result: 13 <--20 ng/L    Pro-Brain Natriuretic Peptide: 2623 pg/mL    Xray Chest 1 View- PORTABLE-Urgent (06.26.25 @ 14:50) >  FINDINGS:    The heart is not accurately assessed in this AP projection.  The lungs are clear.  There is no pneumothorax or pleural effusion.    IMPRESSION:  Clear lungs.      ECG:  Sinus Rhythm  Normal Intervals  No acute ST T wave abnormalities

## 2025-06-27 NOTE — CONSULT NOTE ADULT - TIME BILLING
- Review of records, telemetry, vital signs and daily labs.   - General and cardiovascular physical examination.  - Generation of cardiovascular treatment plan and completion of note .  - Coordination of care-discussed with ACP, and  on disposition plan .      Patient was seen and examined by me on  06/27/2025 ,interim events noted,labs and radiology studies reviewed.  Sujit Whitney MD,FACC.  [address] [place] [address].  [id number].  [phone]  Availabe to call or text on [employer].

## 2025-06-27 NOTE — ED CDU PROVIDER DISPOSITION NOTE - NSFOLLOWUPINSTRUCTIONS_ED_ALL_ED_FT
Hydrate.     We recommend you follow up with your primary care provider within the next 2-3 days, please bring all of your results with you. You can also follow up with **CARDS    Please return to the Emergency Department with new, worsening, or concerning symptoms, such as:  -Shortness of breath or trouble breathing  -Pressure, pain, tightness in chest  -Facial drooping, arm weakness, or speech difficulty   -Head injury or loss of consciousness   -Nonstop bleeding or an open wound     *More detailed information regarding your visit and discharge can be found by reviewing this packet We recommend you follow up with your primary care provider within the next 2-3 days, please bring all of your results with you.     Follow up with your cardiologist within 72 hours     WEIGH YOURSELF DAILY     Please return to the Emergency Department with new, worsening, or concerning symptoms, such as:  -Shortness of breath or trouble breathing  -Pressure, pain, tightness in chest  - significant weight gain  -significant lower extremity swelling

## 2025-06-27 NOTE — CONSULT NOTE ADULT - ASSESSMENT
89-year-old female with past medical history of CAD status post multiple stents and prior MI presents with fatigue.  She reports symptoms began yesterday with generalized fatigue and tiredness.  Denies any other additional symptoms of fever, chills, nausea, chest pain. She reports dyspnea with exertion. Denies leg swelling. Patient with new onset complaints of left lower rib pain as well but denies fall/trauma.    # JORDAN  Hx HTN,CAD s.p remote PCI  Presents with fatigue JORDAN  Clinically euvolemic  No evidence for cardiac injury  TTE-LV Function Aortic valve function  BP stable  If no signicant structural heart abnornalities can discharge to follow up as outpatient  89-year-old female with past medical history of CAD status post multiple stents and prior MI presents with fatigue.  She reports symptoms began yesterday with generalized fatigue and tiredness.  Denies any other additional symptoms of fever, chills, nausea, chest pain. She reports dyspnea with exertion. Denies leg swelling. Patient with new onset complaints of left lower rib pain as well but denies fall/trauma.    # JORDAN  Hx HTN,CAD s.p remote PCI  Presents with fatigue JORDAN  Clinically euvolemic  No evidence for cardiac injury  TTE-LV Function Aortic valve function  BP stable  If no signicant structural heart abnornalities can discharge to follow up as outpatient    # CHEST PAIN  Hx CAD remote PCI  Chest pain localised non exertional-non cardiac  Chronic stable CAD  On Aspirin statin  No need for Ischemic work-up

## 2025-06-27 NOTE — ED CDU PROVIDER SUBSEQUENT DAY NOTE - NS ED ROS FT
· CONSTITUTIONAL: no fever and no chills.  · ROS STATEMENT: all other ROS negative except as per HPI

## 2025-06-27 NOTE — ED CDU PROVIDER DISPOSITION NOTE - CLINICAL COURSE
89-year-old female with past medical history of CAD status post multiple stents and prior MI presents with fatigue.  She reports symptoms began yesterday with generalized fatigue and tiredness.  Denies any other additional symptoms of fever, chills, nausea, chest pain. She reports dyspnea with exertion. Denies leg swelling. Patient with new onset complaints of left lower rib pain as well but denies fall/trauma.  ED course reviewed. trop noted to be 20 and 13. pro bnp [id number] noted to have elevated LFTs. cxr no acute pathology noted. patient sent to cdu due to concern for new onset chf. will obtain TTE, cards eval, and frequent re-evaluations. 89-year-old female with past medical history of CAD status post multiple stents and prior MI presents with fatigue.  She reports symptoms began yesterday with generalized fatigue and tiredness.  Denies any other additional symptoms of fever, chills, nausea, chest pain. She reports dyspnea with exertion. Denies leg swelling. Patient with new onset complaints of left lower rib pain as well but denies fall/trauma.  ED course reviewed. trop noted to be 20 and 13. pro bnp 2623 noted to have elevated LFTs. cxr no acute pathology noted. patient sent to cdu due to concern for new onset chf. will obtain TTE, cards eval, and frequent re-evaluations.  Patient resting comfortably without complaints. no events over tele. Discussed TTE results with cardiology Dr Whitney who cleared pt to be discharged home cardiac standpoint. ED attending Dr Vargas agreed with discharge home.

## 2025-06-27 NOTE — ED CDU PROVIDER SUBSEQUENT DAY NOTE - PHYSICAL EXAMINATION
· CONSTITUTIONAL: Well appearing, awake, alert, oriented to person, place, time/situation and in no apparent distress.  · ENMT: Airway patent.   · EYES: Clear bilaterally  · CARDIAC: Normal rate, regular rhythm.  Heart sounds S1, S2.  No murmur  · RESPIRATORY: Breath sounds clear and equal bilaterally.  · GASTROINTESTINAL: Abdomen soft, non-tender  · MUSCULOSKELETAL: Moving all extremities   · NEUROLOGICAL: Alert and oriented, no focal deficits  No lower extremity edema

## 2025-06-27 NOTE — ED CDU PROVIDER SUBSEQUENT DAY NOTE - PROGRESS NOTE DETAILS
DIANA Bess: Patient seen at bedside in NAD.  VSS.  Patient resting comfortably without complaints. no events over tele. Discussed TTE results with cardiology Dr Whitney who cleared pt to be discharged home cardiac standpoint. ED attending Dr Vargas agreed with discharge home

## 2025-06-27 NOTE — ED CDU PROVIDER DISPOSITION NOTE - PATIENT PORTAL LINK FT
You can access the FollowMyHealth Patient Portal offered by [facility] by registering at the following website: [email or web address]. By joining [employer]’s FollowMyHealth portal, you will also be able to view your health information using other applications (apps) compatible with our system.

## 2025-06-27 NOTE — ED CDU PROVIDER SUBSEQUENT DAY NOTE - HISTORY
No interval changes since initial CDU provider note. Pt feels well without complaint. NAD VSS. no events on tele. Pending echo.  - DIANA Barrow